# Patient Record
Sex: FEMALE | Race: WHITE | Employment: FULL TIME | URBAN - METROPOLITAN AREA
[De-identification: names, ages, dates, MRNs, and addresses within clinical notes are randomized per-mention and may not be internally consistent; named-entity substitution may affect disease eponyms.]

---

## 2017-10-05 ENCOUNTER — OFFICE VISIT (OUTPATIENT)
Dept: DERMATOLOGY | Facility: AMBULATORY SURGERY CENTER | Age: 62
End: 2017-10-05

## 2017-10-05 VITALS
HEIGHT: 61 IN | OXYGEN SATURATION: 98 % | BODY MASS INDEX: 36.25 KG/M2 | DIASTOLIC BLOOD PRESSURE: 76 MMHG | TEMPERATURE: 98.3 F | RESPIRATION RATE: 18 BRPM | WEIGHT: 192.02 LBS | HEART RATE: 78 BPM | SYSTOLIC BLOOD PRESSURE: 142 MMHG

## 2017-10-05 DIAGNOSIS — D18.01 CHERRY ANGIOMA: ICD-10-CM

## 2017-10-05 DIAGNOSIS — L82.1 OTHER SEBORRHEIC KERATOSIS: ICD-10-CM

## 2017-10-05 DIAGNOSIS — L91.8 SKIN TAG: ICD-10-CM

## 2017-10-05 DIAGNOSIS — Z85.828 FOLLOW-UP SURVEILLANCE OF SKIN CANCER, ENCOUNTER FOR: ICD-10-CM

## 2017-10-05 DIAGNOSIS — D22.9 MULTIPLE BENIGN NEVI: ICD-10-CM

## 2017-10-05 DIAGNOSIS — Z08 FOLLOW-UP SURVEILLANCE OF SKIN CANCER, ENCOUNTER FOR: ICD-10-CM

## 2017-10-05 DIAGNOSIS — Z85.820 PERSONAL HISTORY OF MALIGNANT MELANOMA: ICD-10-CM

## 2017-10-05 DIAGNOSIS — D17.21 LIPOMA OF RIGHT FOREARM: ICD-10-CM

## 2017-10-05 DIAGNOSIS — L80 VITILIGO: ICD-10-CM

## 2017-10-05 DIAGNOSIS — D17.20 LIPOMA OF UPPER ARM: ICD-10-CM

## 2017-10-05 DIAGNOSIS — D23.9 DERMATOFIBROMA: Primary | ICD-10-CM

## 2017-10-05 DIAGNOSIS — L90.5 SCAR CONDITION AND FIBROSIS OF SKIN: ICD-10-CM

## 2017-10-05 DIAGNOSIS — D22.39 FIBROUS PAPULE OF NOSE: ICD-10-CM

## 2017-10-05 RX ORDER — LEVOTHYROXINE SODIUM 150 MCG
TABLET ORAL
Refills: 2 | COMMUNITY
Start: 2017-08-01 | End: 2019-02-05

## 2017-10-05 RX ORDER — DEXTROAMPHETAMINE SACCHARATE, AMPHETAMINE ASPARTATE, DEXTROAMPHETAMINE SULFATE AND AMPHETAMINE SULFATE 5; 5; 5; 5 MG/1; MG/1; MG/1; MG/1
TABLET ORAL
Refills: 0 | COMMUNITY
Start: 2017-09-17 | End: 2019-02-05

## 2017-10-05 RX ORDER — LORAZEPAM 1 MG/1
TABLET ORAL
Refills: 5 | COMMUNITY
Start: 2017-09-15

## 2018-07-01 ENCOUNTER — HOSPITAL ENCOUNTER (EMERGENCY)
Age: 63
Discharge: HOME | End: 2018-07-01
Attending: EMERGENCY MEDICINE
Payer: COMMERCIAL

## 2018-07-01 ENCOUNTER — APPOINTMENT (OUTPATIENT)
Dept: CT IMAGING | Age: 63
End: 2018-07-01
Attending: PHYSICIAN ASSISTANT
Payer: COMMERCIAL

## 2018-07-01 VITALS
RESPIRATION RATE: 18 BRPM | WEIGHT: 201 LBS | DIASTOLIC BLOOD PRESSURE: 92 MMHG | HEIGHT: 61 IN | HEART RATE: 110 BPM | OXYGEN SATURATION: 95 % | SYSTOLIC BLOOD PRESSURE: 184 MMHG | BODY MASS INDEX: 37.95 KG/M2 | TEMPERATURE: 99.7 F

## 2018-07-01 DIAGNOSIS — K57.92 DIVERTICULITIS: Primary | ICD-10-CM

## 2018-07-01 LAB
ALBUMIN SERPL-MCNC: 3.9 G/DL (ref 3.5–5)
ALBUMIN/GLOB SERPL: 1 {RATIO} (ref 1.1–2.2)
ALP SERPL-CCNC: 86 U/L (ref 45–117)
ALT SERPL-CCNC: 26 U/L (ref 12–78)
ANION GAP SERPL CALC-SCNC: 9 MMOL/L (ref 5–15)
APPEARANCE UR: CLEAR
AST SERPL-CCNC: 13 U/L (ref 15–37)
BACTERIA URNS QL MICRO: NEGATIVE /HPF
BASOPHILS # BLD: 0.1 K/UL (ref 0–0.1)
BASOPHILS NFR BLD: 0 % (ref 0–1)
BILIRUB SERPL-MCNC: 0.7 MG/DL (ref 0.2–1)
BILIRUB UR QL: NEGATIVE
BUN SERPL-MCNC: 18 MG/DL (ref 6–20)
BUN/CREAT SERPL: 18 (ref 12–20)
CALCIUM SERPL-MCNC: 9.1 MG/DL (ref 8.5–10.1)
CHLORIDE SERPL-SCNC: 101 MMOL/L (ref 97–108)
CO2 SERPL-SCNC: 27 MMOL/L (ref 21–32)
COLOR UR: NORMAL
CREAT SERPL-MCNC: 1.02 MG/DL (ref 0.55–1.02)
DIFFERENTIAL METHOD BLD: ABNORMAL
EOSINOPHIL # BLD: 0.1 K/UL (ref 0–0.4)
EOSINOPHIL NFR BLD: 1 % (ref 0–7)
EPITH CASTS URNS QL MICRO: NORMAL /LPF
ERYTHROCYTE [DISTWIDTH] IN BLOOD BY AUTOMATED COUNT: 13.1 % (ref 11.5–14.5)
GLOBULIN SER CALC-MCNC: 3.9 G/DL (ref 2–4)
GLUCOSE SERPL-MCNC: 119 MG/DL (ref 65–100)
GLUCOSE UR STRIP.AUTO-MCNC: NEGATIVE MG/DL
HCT VFR BLD AUTO: 45.3 % (ref 35–47)
HGB BLD-MCNC: 15.3 G/DL (ref 11.5–16)
HGB UR QL STRIP: NEGATIVE
HYALINE CASTS URNS QL MICRO: NORMAL /LPF (ref 0–5)
IMM GRANULOCYTES # BLD: 0.1 K/UL (ref 0–0.04)
IMM GRANULOCYTES NFR BLD AUTO: 0 % (ref 0–0.5)
KETONES UR QL STRIP.AUTO: NEGATIVE MG/DL
LEUKOCYTE ESTERASE UR QL STRIP.AUTO: NEGATIVE
LIPASE SERPL-CCNC: 97 U/L (ref 73–393)
LYMPHOCYTES # BLD: 1.8 K/UL (ref 0.8–3.5)
LYMPHOCYTES NFR BLD: 11 % (ref 12–49)
MCH RBC QN AUTO: 29.4 PG (ref 26–34)
MCHC RBC AUTO-ENTMCNC: 33.8 G/DL (ref 30–36.5)
MCV RBC AUTO: 87.1 FL (ref 80–99)
MONOCYTES # BLD: 1.4 K/UL (ref 0–1)
MONOCYTES NFR BLD: 8 % (ref 5–13)
NEUTS SEG # BLD: 13.3 K/UL (ref 1.8–8)
NEUTS SEG NFR BLD: 80 % (ref 32–75)
NITRITE UR QL STRIP.AUTO: NEGATIVE
NRBC # BLD: 0 K/UL (ref 0–0.01)
NRBC BLD-RTO: 0 PER 100 WBC
PH UR STRIP: 6 [PH] (ref 5–8)
PLATELET # BLD AUTO: 268 K/UL (ref 150–400)
PMV BLD AUTO: 9.6 FL (ref 8.9–12.9)
POTASSIUM SERPL-SCNC: 3.8 MMOL/L (ref 3.5–5.1)
PROT SERPL-MCNC: 7.8 G/DL (ref 6.4–8.2)
PROT UR STRIP-MCNC: NEGATIVE MG/DL
RBC # BLD AUTO: 5.2 M/UL (ref 3.8–5.2)
RBC #/AREA URNS HPF: NORMAL /HPF (ref 0–5)
SODIUM SERPL-SCNC: 137 MMOL/L (ref 136–145)
SP GR UR REFRACTOMETRY: 1.02 (ref 1–1.03)
UR CULT HOLD, URHOLD: NORMAL
UROBILINOGEN UR QL STRIP.AUTO: 1 EU/DL (ref 0.2–1)
WBC # BLD AUTO: 16.8 K/UL (ref 3.6–11)
WBC URNS QL MICRO: NORMAL /HPF (ref 0–4)

## 2018-07-01 PROCEDURE — 96361 HYDRATE IV INFUSION ADD-ON: CPT

## 2018-07-01 PROCEDURE — 81001 URINALYSIS AUTO W/SCOPE: CPT | Performed by: PHYSICIAN ASSISTANT

## 2018-07-01 PROCEDURE — 74011250637 HC RX REV CODE- 250/637: Performed by: PHYSICIAN ASSISTANT

## 2018-07-01 PROCEDURE — 80053 COMPREHEN METABOLIC PANEL: CPT | Performed by: PHYSICIAN ASSISTANT

## 2018-07-01 PROCEDURE — 36415 COLL VENOUS BLD VENIPUNCTURE: CPT | Performed by: PHYSICIAN ASSISTANT

## 2018-07-01 PROCEDURE — 83690 ASSAY OF LIPASE: CPT | Performed by: PHYSICIAN ASSISTANT

## 2018-07-01 PROCEDURE — 99284 EMERGENCY DEPT VISIT MOD MDM: CPT

## 2018-07-01 PROCEDURE — 74011250636 HC RX REV CODE- 250/636: Performed by: PHYSICIAN ASSISTANT

## 2018-07-01 PROCEDURE — 74011636320 HC RX REV CODE- 636/320: Performed by: RADIOLOGY

## 2018-07-01 PROCEDURE — 74177 CT ABD & PELVIS W/CONTRAST: CPT

## 2018-07-01 PROCEDURE — 85025 COMPLETE CBC W/AUTO DIFF WBC: CPT | Performed by: PHYSICIAN ASSISTANT

## 2018-07-01 PROCEDURE — 96374 THER/PROPH/DIAG INJ IV PUSH: CPT

## 2018-07-01 RX ORDER — HYDROCODONE BITARTRATE AND ACETAMINOPHEN 5; 325 MG/1; MG/1
1 TABLET ORAL
Qty: 20 TAB | Refills: 0 | Status: SHIPPED | OUTPATIENT
Start: 2018-07-01 | End: 2018-12-19

## 2018-07-01 RX ORDER — MORPHINE SULFATE 4 MG/ML
4 INJECTION INTRAVENOUS
Status: COMPLETED | OUTPATIENT
Start: 2018-07-01 | End: 2018-07-01

## 2018-07-01 RX ORDER — METRONIDAZOLE 500 MG/1
500 TABLET ORAL 2 TIMES DAILY
Qty: 14 TAB | Refills: 0 | Status: SHIPPED | OUTPATIENT
Start: 2018-07-01 | End: 2018-07-08

## 2018-07-01 RX ORDER — ACETAMINOPHEN 500 MG
1000 TABLET ORAL
Status: COMPLETED | OUTPATIENT
Start: 2018-07-01 | End: 2018-07-01

## 2018-07-01 RX ORDER — CIPROFLOXACIN 500 MG/1
500 TABLET ORAL 2 TIMES DAILY
Qty: 14 TAB | Refills: 0 | Status: SHIPPED | OUTPATIENT
Start: 2018-07-01 | End: 2018-07-08

## 2018-07-01 RX ORDER — CIPROFLOXACIN 500 MG/1
500 TABLET ORAL
Status: COMPLETED | OUTPATIENT
Start: 2018-07-01 | End: 2018-07-01

## 2018-07-01 RX ORDER — METRONIDAZOLE 250 MG/1
500 TABLET ORAL
Status: COMPLETED | OUTPATIENT
Start: 2018-07-01 | End: 2018-07-01

## 2018-07-01 RX ADMIN — MORPHINE SULFATE 4 MG: 4 INJECTION INTRAVENOUS at 19:49

## 2018-07-01 RX ADMIN — CIPROFLOXACIN HYDROCHLORIDE 500 MG: 500 TABLET, FILM COATED ORAL at 20:07

## 2018-07-01 RX ADMIN — METRONIDAZOLE 500 MG: 250 TABLET ORAL at 20:06

## 2018-07-01 RX ADMIN — ACETAMINOPHEN 1000 MG: 500 TABLET ORAL at 20:06

## 2018-07-01 RX ADMIN — IOPAMIDOL 100 ML: 755 INJECTION, SOLUTION INTRAVENOUS at 18:12

## 2018-07-01 RX ADMIN — SODIUM CHLORIDE 1000 ML: 900 INJECTION, SOLUTION INTRAVENOUS at 19:50

## 2018-07-01 RX ADMIN — SODIUM CHLORIDE 1000 ML: 900 INJECTION, SOLUTION INTRAVENOUS at 18:23

## 2018-07-01 NOTE — ED PROVIDER NOTES
HPI Comments: Mariah Ng is a 63 y.o. female  who presents by private vehicle to ER with c/o Patient presents with:  Abdominal Pain. Patient presents with LLQ abdominal pain since yesterday with fever. Patient seen at [facility] today and sent to ED for further evaluation. She specifically denies any  chills, nausea, vomiting, chest pain, shortness of breath, headache, rash, diarrhea, urinary/bowel changes, sweating or weight loss. PCP: Javier Hubbard MD   PMHx significant for: Past Medical History:  No date: Arthritis  No date: GERD (gastroesophageal reflux disease)  No date: Hypothyroid  No date: Kidney stones  12/2015: Shingles  2004: Skin cancer      Comment: melanoma- L flank  2006: Skin cancer      Comment: melanoma- L frontal scalp  No date: Sleep apnea  No date: Sun-damaged skin  No date: Tanning bed exposure   PSHx significant for: Past Surgical History:  No date: ABDOMEN SURGERY PROC UNLISTED      Comment: cholecystectomy  3/27/2014: HX BREAST BIOPSY      Comment: EXCISION OF LEFT BREAST NODULE performed by                Terrie Santana MD at [facility]  No date: HX COLONOSCOPY  No date: HX GYN      Comment: uterine ablation 2013  No date: HX OTHER SURGICAL      Comment: 2 melanoma surgeries, left flank and left head  No date: HX OTHER SURGICAL      Comment: adrenalectomy  2004: SKIN TISSUE PROCEDURE UNLISTED      Comment: melanoma- L flank  2006: SKIN TISSUE PROCEDURE UNLISTED      Comment: melanoma-L frontal scalp  Social Hx: Tobacco use: Smoking status: Never Smoker                                                              Smokeless status: Never Used                      ; EtOH use: The patient states she drinks socially per week.; Illicit Drug use: Allergies:  No Known Allergies    There are no other complaints, changes or physical findings at this time. Patient is a 63 y.o. female presenting with abdominal pain. The history is provided by the patient.    Abdominal Pain    This is a new problem. The current episode started yesterday. The problem occurs constantly. The problem has been gradually worsening. The pain is associated with vomiting. The pain is located in the LLQ. The quality of the pain is sharp. The pain is at a severity of 9/10. The pain is severe. Pertinent negatives include no anorexia, no fever, no belching, no diarrhea, no flatus, no hematochezia, no melena, no nausea, no vomiting, no constipation, no dysuria, no frequency, no hematuria, no headaches, no arthralgias, no myalgias, no chest pain, no testicular pain and no back pain. Nothing worsens the pain. The pain is relieved by nothing. Past workup includes colonoscopy. Past workup includes no CT scan, no ultrasound, no surgery, no esophagogastroduodenoscopy, no UGI, no barium enema. The patient's surgical history includes cholecystectomy. The patient's surgical history non-contributory. Past Medical History:   Diagnosis Date    Arthritis     GERD (gastroesophageal reflux disease)     Hypothyroid     Kidney stones     Shingles 12/2015    Skin cancer 2004    melanoma- L flank    Skin cancer 2006    melanoma- L frontal scalp    Sleep apnea     Sun-damaged skin     Tanning bed exposure        Past Surgical History:   Procedure Laterality Date    ABDOMEN SURGERY PROC UNLISTED      cholecystectomy    HX BREAST BIOPSY  3/27/2014    EXCISION OF LEFT BREAST NODULE performed by Manuel Vasquez MD at [address] [facility] HX COLONOSCOPY      HX GYN      uterine ablation 2013    HX OTHER SURGICAL      2 melanoma surgeries, left flank and left head    HX OTHER SURGICAL      adrenalectomy    SKIN TISSUE PROCEDURE UNLISTED  2004    melanoma- L flank    SKIN TISSUE PROCEDURE UNLISTED  2006    melanoma-L frontal scalp         History reviewed. No pertinent family history.     Social History     Social History    Marital status:      Spouse name: N/A    Number of children: N/A    Years of education: N/A Occupational History    Not on file. Social History Main Topics    Smoking status: Never Smoker    Smokeless tobacco: Never Used    Alcohol use Yes      Comment: socially    Drug use: Not on file    Sexual activity: Not on file     Other Topics Concern    Not on file     Social History Narrative         ALLERGIES: Review of patient's allergies indicates no known allergies. Review of Systems   Constitutional: Negative for fever. Cardiovascular: Negative for chest pain. Gastrointestinal: Positive for abdominal pain. Negative for anorexia, constipation, diarrhea, flatus, hematochezia, melena, nausea and vomiting. Genitourinary: Negative for dysuria, frequency, hematuria and testicular pain. Musculoskeletal: Negative for arthralgias, back pain and myalgias. Neurological: Negative for headaches. All other systems reviewed and are negative. Vitals:    07/01/18 1918 07/01/18 1930 07/01/18 1945 07/01/18 1952   BP: (!) 184/92      Pulse: (!) 110      Resp: 18      Temp:    (!) 100.7 °F (38.2 °C)   SpO2: 98% 97% 96%    Weight:       Height:                Physical Exam     MDM  Number of Diagnoses or Management Options  Diverticulitis:   Diagnosis management comments: Assesment/Plan- 63 y.o. Patient presents with:  Abdominal Pain  differential includes: diverticulitis, colitis, SBO. Labs and imaging reviewed with leukocytosis, elevated glucose, ct showing diverticulitis. Patient well appearing, fever improved, tolerating PO. Discharged home. Recommend GI follow up. Patient educated on reasons to return to the ED.          Amount and/or Complexity of Data Reviewed  Clinical lab tests: ordered and reviewed  Tests in the radiology section of CPT®: ordered and reviewed  Tests in the medicine section of CPT®: ordered and reviewed  Discuss the patient with other providers: yes (Attending- Dr. Bunny Marques who also saw patient and agrees with plan  )          ED Course       Procedures

## 2018-07-01 NOTE — ED NOTES
Bedside and Verbal shift change report given to Naa Dunn RN (oncoming nurse) by Sha Lambert RN (offgoing nurse). Report included the following information SBAR, Kardex, ED Summary, Procedure Summary, Intake/Output, MAR and Recent Results.

## 2018-07-01 NOTE — ED TRIAGE NOTES
Pt states left lower abdominal pain and swelling and tenderness onset yesterday.  Pt denies any nausea, vomiting or diarrhea

## 2018-07-02 NOTE — ED NOTES
HARLEEN Garcia at bedside discharging patient; instructions reviewed by provider. Patient exited ED prior to RN reassessment.

## 2018-07-02 NOTE — DISCHARGE INSTRUCTIONS
Diverticulitis: Care Instructions  Your Care Instructions    Diverticulitis occurs when pouches form in the wall of the colon and become inflamed or infected. It can be very painful. Doctors aren't sure what causes diverticulitis. There is no proof that foods such as nuts, seeds, or berries cause it or make it worse. A low-fiber diet may cause the colon to work harder to push stool forward. Pouches may form because of this extra work. It may be hard to think about healthy eating while you're in pain. But as you recover, you might think about how you can use healthy eating for overall better health. Healthy eating may help you avoid future attacks. Follow-up care is a key part of your treatment and safety. Be sure to make and go to all appointments, and call your doctor if you are having problems. It's also a good idea to know your test results and keep a list of the medicines you take. How can you care for yourself at home? · Drink plenty of fluids, enough so that your urine is light yellow or clear like water. If you have kidney, heart, or liver disease and have to limit fluids, talk with your doctor before you increase the amount of fluids you drink. · Stick to liquids or a bland diet (plain rice, bananas, dry toast or crackers, applesauce) until you are feeling better. Then you can return to regular foods and gradually increase the amount of fiber in your diet. · Use a heating pad set on low on your belly to relieve mild cramps and pain. · Get extra rest until you are feeling better. · Be safe with medicines. Read and follow all instructions on the label. ¨ If the doctor gave you a prescription medicine for pain, take it as prescribed. ¨ If you are not taking a prescription pain medicine, ask your doctor if you can take an over-the-counter medicine. · If your doctor prescribed antibiotics, take them as directed. Do not stop taking them just because you feel better.  You need to take the full course of antibiotics. To prevent future attacks of diverticulitis  · Avoid constipation:  ¨ Include fruits, vegetables, beans, and whole grains in your diet each day. These foods are high in fiber. ¨ Drink plenty of fluids, enough so that your urine is light yellow or clear like water. If you have kidney, heart, or liver disease and have to limit fluids, talk with your doctor before you increase the amount of fluids you drink. ¨ Get some exercise every day. Build up slowly to 30 to 60 minutes a day on 5 or more days of the week. ¨ Take a fiber supplement, such as Citrucel or Metamucil, every day if needed. Read and follow all instructions on the label. ¨ Schedule time each day for a bowel movement. Having a daily routine may help. Take your time and do not strain when having a bowel movement. When should you call for help? Call your doctor now or seek immediate medical care if:  ? · You have a fever. ? · You are vomiting. ? · You have new or worse belly pain. ? · You cannot pass stools or gas. ? Watch closely for changes in your health, and be sure to contact your doctor if you have any problems. Where can you learn more? Go to http://comfort-trevin.info/. Enter H901 in the search box to learn more about \"Diverticulitis: Care Instructions. \"  Current as of: May 12, 2017  Content Version: 11.4  © 9200-9362 [employer]. Care instructions adapted under license by [employer] (which disclaims liability or warranty for this information). If you have questions about a medical condition or this instruction, always ask your healthcare professional. [address] any warranty or liability for your use of this information. We hope that we have addressed all of your medical concerns. The examination and treatment you received in the Emergency Department were for an emergent problem and were not intended as complete care.  It is important that you follow up with your healthcare provider(s) for ongoing care. If your symptoms worsen or do not improve as expected, and you are unable to reach your usual health care provider(s), you should return to the Emergency Department. Today's healthcare is undergoing tremendous change, and patient satisfaction surveys are one of the many tools to assess the quality of medical care. You may receive a survey from the [employer] regarding your experience in the Emergency Department. I hope that your experience has been completely positive, particularly the medical care that I provided. As such, please participate in the survey; anything less than excellent does not meet my expectations or intentions. [facility] and [facility] participate in nationally recognized quality of care measures. If your blood pressure is greater than 120/80, as reported below, we urge that you seek medical care to address the potential of high blood pressure, commonly known as hypertension. Hypertension can be hereditary or can be caused by certain medical conditions, pain, stress, or \"white coat syndrome. \"       Please make an appointment with your health care provider(s) for follow up of your Emergency Department visit. VITALS:   Patient Vitals for the past 8 hrs:   Temp Pulse Resp BP SpO2   07/01/18 2056 99.7 °F (37.6 °C) - - - -   07/01/18 2045 - - - - 95 %   07/01/18 2030 - - - - 97 %   07/01/18 2015 - - - - 98 %   07/01/18 2000 - - - - 94 %   07/01/18 1952 (!) 100.7 °F (38.2 °C) - - - -   07/01/18 1945 - - - - 96 %   07/01/18 1930 - - - - 97 %   07/01/18 1918 - (!) 110 18 (!) 184/92 98 %   07/01/18 1744 99 °F (37.2 °C) (!) 120 18 140/83 98 %          Thank you for allowing us to provide you with medical care today. We realize that you have many choices for your emergency care needs. Please choose us in the future for any continued health care needs.       Regards, Yadira Winters, [facility].   Office: [phone]            Recent Results (from the past 24 hour(s))   CBC WITH AUTOMATED DIFF    Collection Time: 07/01/18  6:20 PM   Result Value Ref Range    WBC 16.8 (H) 3.6 - 11.0 K/uL    RBC 5.20 3.80 - 5.20 M/uL    HGB 15.3 11.5 - 16.0 g/dL    HCT 45.3 35.0 - 47.0 %    MCV 87.1 80.0 - 99.0 FL    MCH 29.4 26.0 - 34.0 PG    MCHC 33.8 30.0 - 36.5 g/dL    RDW 13.1 11.5 - 14.5 %    PLATELET 174 970 - 714 K/uL    MPV 9.6 8.9 - 12.9 FL    NRBC 0.0 0  WBC    ABSOLUTE NRBC 0.00 0.00 - 0.01 K/uL    NEUTROPHILS 80 (H) 32 - 75 %    LYMPHOCYTES 11 (L) 12 - 49 %    MONOCYTES 8 5 - 13 %    EOSINOPHILS 1 0 - 7 %    BASOPHILS 0 0 - 1 %    IMMATURE GRANULOCYTES 0 0.0 - 0.5 %    ABS. NEUTROPHILS 13.3 (H) 1.8 - 8.0 K/UL    ABS. LYMPHOCYTES 1.8 0.8 - 3.5 K/UL    ABS. MONOCYTES 1.4 (H) 0.0 - 1.0 K/UL    ABS. EOSINOPHILS 0.1 0.0 - 0.4 K/UL    ABS. BASOPHILS 0.1 0.0 - 0.1 K/UL    ABS. IMM. GRANS. 0.1 (H) 0.00 - 0.04 K/UL    DF AUTOMATED     LIPASE    Collection Time: 07/01/18  6:20 PM   Result Value Ref Range    Lipase 97 73 - 163 U/L   METABOLIC PANEL, COMPREHENSIVE    Collection Time: 07/01/18  6:20 PM   Result Value Ref Range    Sodium 137 136 - 145 mmol/L    Potassium 3.8 3.5 - 5.1 mmol/L    Chloride 101 97 - 108 mmol/L    CO2 27 21 - 32 mmol/L    Anion gap 9 5 - 15 mmol/L    Glucose 119 (H) 65 - 100 mg/dL    BUN 18 6 - 20 MG/DL    Creatinine 1.02 0.55 - 1.02 MG/DL    BUN/Creatinine ratio 18 12 - 20      GFR est AA >60 >60 ml/min/1.73m2    GFR est non-AA 55 (L) >60 ml/min/1.73m2    Calcium 9.1 8.5 - 10.1 MG/DL    Bilirubin, total 0.7 0.2 - 1.0 MG/DL    ALT (SGPT) 26 12 - 78 U/L    AST (SGOT) 13 (L) 15 - 37 U/L    Alk.  phosphatase 86 45 - 117 U/L    Protein, total 7.8 6.4 - 8.2 g/dL    Albumin 3.9 3.5 - 5.0 g/dL    Globulin 3.9 2.0 - 4.0 g/dL    A-G Ratio 1.0 (L) 1.1 - 2.2     URINALYSIS W/MICROSCOPIC    Collection Time: 07/01/18  6:20 PM   Result Value Ref Range    Color YELLOW/STRAW      Appearance CLEAR CLEAR      Specific gravity 1.021 1.003 - 1.030      pH (UA) 6.0 5.0 - 8.0      Protein NEGATIVE  NEG mg/dL    Glucose NEGATIVE  NEG mg/dL    Ketone NEGATIVE  NEG mg/dL    Bilirubin NEGATIVE  NEG      Blood NEGATIVE  NEG      Urobilinogen 1.0 0.2 - 1.0 EU/dL    Nitrites NEGATIVE  NEG      Leukocyte Esterase NEGATIVE  NEG      WBC 0-4 0 - 4 /hpf    RBC 0-5 0 - 5 /hpf    Epithelial cells FEW FEW /lpf    Bacteria NEGATIVE  NEG /hpf    Hyaline cast 0-2 0 - 5 /lpf   URINE CULTURE HOLD SAMPLE    Collection Time: 07/01/18  6:20 PM   Result Value Ref Range    Urine culture hold        URINE ON HOLD IN MICROBIOLOGY DEPT FOR 3 DAYS. IF UNPRESERVED URINE IS SUBMITTED, IT CANNOT BE USED FOR ADDITIONAL TESTING AFTER 24 HRS, RECOLLECTION WILL BE REQUIRED. Ct Abd Pelv W Cont    Result Date: 7/1/2018  EXAM:  CT ABD PELV W CONT INDICATION: LLQ abdominal pain COMPARISON: April 16, 2009 CONTRAST:  100 mL of Isovue-370. TECHNIQUE: Following the uneventful intravenous administration of contrast, thin axial images were obtained through the abdomen and pelvis. Coronal and sagittal reconstructions were generated. Oral contrast was not administered. CT dose reduction was achieved through use of a standardized protocol tailored for this examination and automatic exposure control for dose modulation. FINDINGS: LUNG BASES: Clear. INCIDENTALLY IMAGED HEART AND MEDIASTINUM: Unremarkable. LIVER: No mass or biliary dilatation. GALLBLADDER: Surgically absent. SPLEEN: No mass. PANCREAS: No mass or ductal dilatation. ADRENALS: Normal right adrenal gland. Left adrenal gland is surgically absent. KIDNEYS: No mass, calculus, or hydronephrosis. 12 mm left renal cyst. STOMACH: Unremarkable. SMALL BOWEL: No dilatation or wall thickening. COLON: Sigmoid diverticulosis. Wall thickening and pericolonic inflammatory changes are noted in the proximal sigmoid colon. No associated abscess. APPENDIX: Normal. PERITONEUM: No ascites or pneumoperitoneum. RETROPERITONEUM: No lymphadenopathy or aortic aneurysm. REPRODUCTIVE ORGANS: Normal uterus and ovaries. URINARY BLADDER: No mass or calculus. BONES: No destructive bone lesion. ADDITIONAL COMMENTS: N/A     IMPRESSION: Acute sigmoid colon diverticulitis, without evidence of abscess or perforation. Clinical follow-up is recommended to assure complete resolution.

## 2018-07-05 ENCOUNTER — PATIENT OUTREACH (OUTPATIENT)
Dept: OTHER | Age: 63
End: 2018-07-05

## 2018-07-12 ENCOUNTER — PATIENT OUTREACH (OUTPATIENT)
Dept: OTHER | Age: 63
End: 2018-07-12

## 2018-07-12 NOTE — PROGRESS NOTES
Transition Of Care Note    Patient discharged from ED following episode of acute abdominal pain with noted recent diet change and history of diverticulosis; Medical History:     Past Medical History:   Diagnosis Date    Arthritis     GERD (gastroesophageal reflux disease)     Hypothyroid     Kidney stones     Shingles 2015    Skin cancer     melanoma- L flank    Skin cancer     melanoma- L frontal scalp    Sleep apnea     Sun-damaged skin     Tanning bed exposure        Care Manager contacted the patient by telephone to perform post ED discharge assessment. Verified  and zip code with patient as identifiers. Provided introduction to self, and explanation of the Nurse Care Manager role. Agreed to CM follow-up and assistance. CM initial assessment completed. States she was feeling better, has been taking Norco for pain with good control, until today;  Stated she ate some diana seed products lately and feels this may have caused the diverticulitis symptoms; Note in ER patient with acute abdominal pain, leukocytosis with left shift and fever 101;     Medication:   New Medications at Discharge:  Norco, Flagyl and Cipro  Changed Medications at Discharge:  NA   Discontinued Medications at Discharge: None    Current Outpatient Prescriptions   Medication Sig    HYDROcodone-acetaminophen (NORCO) 5-325 mg per tablet Take 1 Tab by mouth every four (4) hours as needed for Pain. Max Daily Amount: 6 Tabs.  SYNTHROID 150 mcg tablet TAKE 1 TABLET ON AN EMPTY STOMACH IN THE MORNING ONCE A DAY ORALLY 90 DAYS    LORazepam (ATIVAN) 1 mg tablet TAKE 1 TABLET BY MOUTH TWICE A DAY AS NEEDED FOR ANXIETY    ALPRAZolam (XANAX) 2 mg tablet TAKE 1 TABLET BY MOUTH AT BEDTIME    hydrochlorothiazide (HYDRODIURIL) 25 mg tablet     esomeprazole (NEXIUM) 20 mg capsule Take  by mouth daily.  dextroamphetamine-amphetamine (ADDERALL) 10 mg tablet Take 10 mg by mouth.     levothyroxine (SYNTHROID) 137 mcg tablet Take  by mouth Daily (before breakfast).  escitalopram (LEXAPRO) 20 mg tablet Take 20 mg by mouth daily.  cholecalciferol, vitamin D3, (VITAMIN D3) 2,000 unit Tab Take  by mouth.  dextroamphetamine-amphetamine (ADDERALL) 20 mg tablet TAKE 1 TABLET BY MOUTH TWICE A DAY    potassium chloride SR (KLOR-CON 10) 10 mEq tablet Take 20 mEq by mouth daily. No current facility-administered medications for this visit. There are no discontinued medications. Performed medication reconciliation with patient, and patient verbalizes understanding of administration of home medications. There were no barriers to obtaining medications identified at this time. Inpatient RRAT score: NA   Was this a readmission? no   Patient stated reason for the readmission: NA    Barriers/Support system:  patient    Barriers/Challenges to Care: []  Decline in memory    []  Language barrier     []  Emotional                  []  Limited mobility  []  Lack of motivation     [] Vision, hearing or cognitive impairment [x]  Knowledge [] Financial Barriers []  Lack of support  []  Pain []  Other     Red Flags:  Call your doctor if you   · Have new or worsening fever, chills;  · Start vomiting or become unable to tolerate fluids;  · Develop worsening belly pain;  · Do not pass stool or gas; Discharge Instructions :  Reviewed discharge instructions with patient. Patient verbalizes understanding of discharge instructions and follow-up care. Advance Care Planning:   Patient was offered the opportunity to discuss advance care planning:  yes     Does patient have an Advance Directive:  yes   If no, did you provide information on Caring Connections?  no     PCP/Specialist follow up: Patient scheduled to follow up with Javier Hubbard MD.  No appt made as yet, declined assistance, but plans to schedule a FU.      Future Appointments  Date Time Provider Anastasia Sosa   10/8/2018 3:30 PM Kyle Gutierrez NP [facility] [address]      Reviewed red flags with patient, and patient verbalizes understanding. Patient given an opportunity to ask questions. No other clinical/social/functional needs noted. The patient agrees to contact the PCP office for questions related to their healthcare. The patient expressed thanks, offered no additional questions and ended the call.

## 2018-08-01 ENCOUNTER — PATIENT OUTREACH (OUTPATIENT)
Dept: OTHER | Age: 63
End: 2018-08-01

## 2018-08-01 NOTE — PROGRESS NOTES
Care Manager contacted the patient by telephone in follow up. Verified  and zip code with patient as identifiers. Assessment of learning acknowledged and behaviors demonstrated since our last call, as well as, education and evaluation performed during this call as part of the ongoing care plan: 
 
Ongoing Plan of Care: 
Self-Management Skills for Diverticulitis Demonstrates skills to manage Diverticular disease and prevent flares · Altered Nutrition Reduced nausea during flares in condition 
o Take her antiemetic when needed to prevent vomting; 
o Drink plenty of fluids;  Urine should be light yellow or clear like water; 
o Initially stick to Clear or Full Liquids or a bland diet until you feel better; 
o Eat bland - plain rice, bananas, dry toast or crackers, applesauce until you feel better; 
o GOAL Met, no further symptoms at this time; 
 
· Activity Intolerance during Flares Demonstrates behavior changes to allow for healing/rest 
· Get extra rest until you are feeling better, at least 8 hrs sleep; 
· GOAL MET, has returned to work and resumed normal activity; Medication Regimen Change: None; 
Completed a review of medications with patient, who verbalized understanding of how and when to take medications. Barriers / Adherence with medications: None; 
 
Upcoming Appointments: Did not want to schedule PCP FU appt; Patient asked questions appropriately and denied any additional needs at this time. Patient verbalized understanding of all information discussed. Patient has my name and contact information for any follow up needs or questions.  
 
Plan: FU in two weeks to resolve if no further needs identified;

## 2018-08-13 ENCOUNTER — PATIENT OUTREACH (OUTPATIENT)
Dept: OTHER | Age: 63
End: 2018-08-13

## 2018-08-14 NOTE — PROGRESS NOTES
Resolving current episode. Transitions of care complete. No recurrence of red flags. Goals met. No further ED/UC or hospital admissions within 30 days post discharge. Patient attended follow-up appointments as directed. No outreach from patient to [address] [place] [address].

## 2018-12-04 DIAGNOSIS — Z12.39 BREAST SCREENING: Primary | ICD-10-CM

## 2018-12-19 ENCOUNTER — OFFICE VISIT (OUTPATIENT)
Dept: URGENT CARE | Age: 63
End: 2018-12-19

## 2018-12-19 VITALS
SYSTOLIC BLOOD PRESSURE: 198 MMHG | DIASTOLIC BLOOD PRESSURE: 91 MMHG | BODY MASS INDEX: 39.46 KG/M2 | TEMPERATURE: 97.9 F | RESPIRATION RATE: 16 BRPM | WEIGHT: 209 LBS | OXYGEN SATURATION: 99 % | HEART RATE: 100 BPM | HEIGHT: 61 IN

## 2018-12-19 DIAGNOSIS — M25.561 RIGHT KNEE PAIN, UNSPECIFIED CHRONICITY: Primary | ICD-10-CM

## 2018-12-19 PROBLEM — E66.01 SEVERE OBESITY (HCC): Status: ACTIVE | Noted: 2018-12-19

## 2018-12-19 RX ORDER — DICLOFENAC SODIUM 75 MG/1
75 TABLET, DELAYED RELEASE ORAL 2 TIMES DAILY
Qty: 30 TAB | Refills: 0 | Status: SHIPPED | OUTPATIENT
Start: 2018-12-19 | End: 2019-02-05

## 2018-12-19 NOTE — PATIENT INSTRUCTIONS
Knee: Exercises  Your Care Instructions  Here are some examples of exercises for your knee. Start each exercise slowly. Ease off the exercise if you start to have pain. Your doctor or physical therapist will tell you when you can start these exercises and which ones will work best for you. How to do the exercises  Quad sets    1. Sit with your leg straight and supported on the floor or a firm bed. (If you feel discomfort in the front or back of your knee, place a small towel roll under your knee.)  2. Tighten the muscles on top of your thigh by pressing the back of your knee flat down to the floor. (If you feel discomfort under your kneecap, place a small towel roll under your knee.)  3. Hold for about 6 seconds, then rest for up to 10 seconds. 4. Do 8 to 12 repetitions several times a day. Straight-leg raises to the front    1. Lie on your back with your good knee bent so that your foot rests flat on the floor. Your injured leg should be straight. Make sure that your low back has a normal curve. You should be able to slip your flat hand in between the floor and the small of your back, with your palm touching the floor and your back touching the back of your hand. 2. Tighten the thigh muscles in the injured leg by pressing the back of your knee flat down to the floor. Hold your knee straight. 3. Keeping the thigh muscles tight, lift your injured leg up so that your heel is about 12 inches off the floor. Hold for about 6 seconds and then lower slowly. 4. Do 8 to 12 repetitions, 3 times a day. Straight-leg raises to the outside    1. Lie on your side, with your injured leg on top. 2. Tighten the front thigh muscles of your injured leg to keep your knee straight. 3. Keep your hip and your leg straight in line with the rest of your body, and keep your knee pointing forward. Do not drop your hip back. 4. Lift your injured leg straight up toward the ceiling, about 12 inches off the floor.  Hold for about 6 seconds, then slowly lower your leg. 5. Do 8 to 12 repetitions. Straight-leg raises to the back    1. Lie on your stomach, and lift your leg straight up behind you (toward the ceiling). 2. Lift your toes about 6 inches off the floor, hold for about 6 seconds, then lower slowly. 3. Do 8 to 12 repetitions. Straight-leg raises to the inside    1. Lie on the side of your body with the injured leg. 2. You can either prop your other (good) leg up on a chair, or you can bend your good knee and put that foot in front of your injured knee. Do not drop your hip back. 3. Tighten the muscles on the front of your thigh to straighten your injured knee. 4. Keep your kneecap pointing forward, and lift your whole leg up toward the ceiling about 6 inches. Hold for about 6 seconds, then lower slowly. 5. Do 8 to 12 repetitions. Heel dig bridging    1. Lie on your back with both knees bent and your ankles bent so that only your heels are digging into the floor. Your knees should be bent about 90 degrees. 2. Then push your heels into the floor, squeeze your buttocks, and lift your hips off the floor until your shoulders, hips, and knees are all in a straight line. 3. Hold for about 6 seconds as you continue to breathe normally, and then slowly lower your hips back down to the floor and rest for up to 10 seconds. 4. Do 8 to 12 repetitions. Hamstring curls    1. Lie on your stomach with your knees straight. If your kneecap is uncomfortable, roll up a washcloth and put it under your leg just above your kneecap. 2. Lift the foot of your injured leg by bending the knee so that you bring the foot up toward your buttock. If this motion hurts, try it without bending your knee quite as far. This may help you avoid any painful motion. 3. Slowly lower your leg back to the floor. 4. Do 8 to 12 repetitions.   5. With permission from your doctor or physical therapist, you may also want to add a cuff weight to your ankle (not more than 5 pounds). With weight, you do not have to lift your leg more than 12 inches to get a hamstring workout. Shallow standing knee bends    1. Stand with your hands lightly resting on a counter or chair in front of you. Put your feet shoulder-width apart. 2. Slowly bend your knees so that you squat down like you are going to sit in a chair. Make sure your knees do not go in front of your toes. 3. Lower yourself about 6 inches. Your heels should remain on the floor at all times. 4. Rise slowly to a standing position. Heel raises    1. Stand with your feet a few inches apart, with your hands lightly resting on a counter or chair in front of you. 2. Slowly raise your heels off the floor while keeping your knees straight. 3. Hold for about 6 seconds, then slowly lower your heels to the floor. 4. Do 8 to 12 repetitions several times during the day. Follow-up care is a key part of your treatment and safety. Be sure to make and go to all appointments, and call your doctor if you are having problems. It's also a good idea to know your test results and keep a list of the medicines you take. Where can you learn more? Go to http://comfort-trevin.info/. Enter F575 in the search box to learn more about \"Knee: Exercises. \"  Current as of: November 29, 2017  Content Version: 11.8  © 6246-0478 Healthwise, [employer]. Care instructions adapted under license by [employer] (which disclaims liability or warranty for this information). If you have questions about a medical condition or this instruction, always ask your healthcare professional. [place] [address] any warranty or liability for your use of this information.

## 2018-12-19 NOTE — PROGRESS NOTES
Knee Pain   This is a new problem. Episode onset: few months. The problem occurs daily. The problem has been gradually worsening. Associated symptoms comments: Stiffness- decreased movement. The symptoms are aggravated by walking, twisting and bending. The symptoms are relieved by rest. She has tried acetaminophen for the symptoms. The treatment provided mild relief. Past Medical History:   Diagnosis Date    Arthritis     GERD (gastroesophageal reflux disease)     Hypothyroid     Kidney stones     Shingles 12/2015    Skin cancer 2004    melanoma- L flank    Skin cancer 2006    melanoma- L frontal scalp    Sleep apnea     Sun-damaged skin     Tanning bed exposure         Past Surgical History:   Procedure Laterality Date    ABDOMEN SURGERY PROC UNLISTED      cholecystectomy    HX BREAST BIOPSY  3/27/2014    [facility] [address] HX COLONOSCOPY      HX GYN      uterine ablation 2013    HX OTHER SURGICAL      2 melanoma surgeries, left flank and left head    HX OTHER SURGICAL      adrenalectomy    SKIN TISSUE PROCEDURE UNLISTED  2004    melanoma- L flank    SKIN TISSUE PROCEDURE UNLISTED  2006    melanoma-L frontal scalp         History reviewed. No pertinent family history.      Social History     Socioeconomic History    Marital status:      Spouse name: Not on file    Number of children: Not on file    Years of education: Not on file    Highest education level: Not on file   Social Needs    Financial resource strain: Not on file    Food insecurity - worry: Not on file    Food insecurity - inability: Not on file    Transportation needs - medical: Not on file   [employer] needs - non-medical: Not on file   Occupational History    Not on file   Tobacco Use    Smoking status: Never Smoker    Smokeless tobacco: Never Used   Substance and Sexual Activity    Alcohol use: Yes     Comment: socially    Drug use: Not on file    Sexual activity: Not on file   Other Topics Concern    Not on file   Social History Narrative    Not on file                ALLERGIES: Patient has no known allergies. Review of Systems   All other systems reviewed and are negative. Vitals:    12/19/18 1737   BP: (!) 198/91   Pulse: 100   Resp: 16   Temp: 97.9 °F (36.6 °C)   SpO2: 99%   Weight: 209 lb (94.8 kg)   Height: 5' 1\" (1.549 m)       Physical Exam   Constitutional: No distress. Musculoskeletal:        Right hip: Normal.        Right knee: She exhibits normal range of motion, no swelling, no effusion, no deformity, normal alignment, no LCL laxity, normal patellar mobility, no bony tenderness, normal meniscus and no MCL laxity. No tenderness found. Nursing note and vitals reviewed. MDM    Procedures    ICD-10-CM ICD-9-CM    1. Right knee pain, unspecified chronicity M25.561 719.46 CANCELED: XR KNEE RT MAX 2 VWS     Medications Ordered Today   Medications    diclofenac EC (VOLTAREN) 75 mg EC tablet     Sig: Take 1 Tab by mouth two (2) times a day. Dispense:  30 Tab     Refill:  0     Self exercise  Results for orders placed or performed in visit on 12/19/18   XR KNEE RT 3 V    Narrative    EXAM: XR KNEE RT 3 V    INDICATION: R knee pain. COMPARISON: None. FINDINGS: Three views of the right knee demonstrate no fracture or other acute  osseous or articular abnormality. There is no effusion. There is mild spurring  in all compartments without significant joint space narrowing. Impression    IMPRESSION: No acute findings. Very mild osteoarthritis. .     The patients condition was discussed with the patient and they understand. The patient is to follow up with primary care doctor. If signs and symptoms become worse the pt is to go to the ER. The patient is to take medications as prescribed.

## 2019-01-04 DIAGNOSIS — Z12.39 SCREENING BREAST EXAMINATION: Primary | ICD-10-CM

## 2019-01-10 ENCOUNTER — HOSPITAL ENCOUNTER (OUTPATIENT)
Dept: MAMMOGRAPHY | Age: 64
Discharge: HOME | End: 2019-01-10
Attending: SURGERY
Payer: COMMERCIAL

## 2019-01-10 DIAGNOSIS — Z12.39 BREAST SCREENING: ICD-10-CM

## 2019-01-10 PROCEDURE — 77067 SCR MAMMO BI INCL CAD: CPT

## 2019-01-14 DIAGNOSIS — R92.8 ABNORMAL MAMMOGRAM: Primary | ICD-10-CM

## 2019-01-23 ENCOUNTER — HOSPITAL ENCOUNTER (OUTPATIENT)
Dept: MAMMOGRAPHY | Age: 64
Discharge: HOME | End: 2019-01-23
Attending: SURGERY
Payer: COMMERCIAL

## 2019-01-23 DIAGNOSIS — N63.20 LEFT BREAST MASS: Primary | ICD-10-CM

## 2019-01-23 DIAGNOSIS — R92.8 ABNORMAL MAMMOGRAM: ICD-10-CM

## 2019-01-23 DIAGNOSIS — N63.20 LEFT BREAST MASS: ICD-10-CM

## 2019-01-23 DIAGNOSIS — N63.20 BREAST MASS, LEFT: ICD-10-CM

## 2019-01-23 PROCEDURE — A4648 IMPLANTABLE TISSUE MARKER: HCPCS

## 2019-01-23 PROCEDURE — 77065 DX MAMMO INCL CAD UNI: CPT

## 2019-01-23 PROCEDURE — 76642 ULTRASOUND BREAST LIMITED: CPT

## 2019-01-23 PROCEDURE — 74011000250 HC RX REV CODE- 250: Performed by: RADIOLOGY

## 2019-01-23 PROCEDURE — 74011250636 HC RX REV CODE- 250/636: Performed by: RADIOLOGY

## 2019-01-23 RX ORDER — LIDOCAINE HYDROCHLORIDE AND EPINEPHRINE 10; 10 MG/ML; UG/ML
8 INJECTION, SOLUTION INFILTRATION; PERINEURAL ONCE
Status: COMPLETED | OUTPATIENT
Start: 2019-01-23 | End: 2019-01-23

## 2019-01-23 RX ORDER — LIDOCAINE HYDROCHLORIDE 10 MG/ML
8 INJECTION INFILTRATION; PERINEURAL
Status: COMPLETED | OUTPATIENT
Start: 2019-01-23 | End: 2019-01-23

## 2019-01-23 RX ORDER — SODIUM BICARBONATE 42 MG/ML
4 INJECTION, SOLUTION INTRAVENOUS
Status: COMPLETED | OUTPATIENT
Start: 2019-01-23 | End: 2019-01-23

## 2019-01-23 RX ADMIN — LIDOCAINE HYDROCHLORIDE AND EPINEPHRINE 80 MG: 10; 10 INJECTION, SOLUTION INFILTRATION; PERINEURAL at 11:28

## 2019-01-23 RX ADMIN — LIDOCAINE HYDROCHLORIDE 8 ML: 10 INJECTION, SOLUTION INFILTRATION; PERINEURAL at 11:28

## 2019-01-23 RX ADMIN — SODIUM BICARBONATE 168 MG: 42 SOLUTION INTRAVENOUS at 11:28

## 2019-01-23 NOTE — PROGRESS NOTES
Patient received for left breast biopsy for mass seen on ultrasound. Procedure explained to patient as well as risks associated with procedure. Post biopsy discharge instructions reviewed with patient. Patient prefers to be contacted by phone with pathology results.

## 2019-01-28 ENCOUNTER — TELEPHONE (OUTPATIENT)
Dept: SURGERY | Age: 64
End: 2019-01-28

## 2019-01-30 DIAGNOSIS — C50.012 CANCER OF NIPPLE OF LEFT BREAST (HCC): Primary | ICD-10-CM

## 2019-02-05 ENCOUNTER — HOSPITAL ENCOUNTER (OUTPATIENT)
Dept: PREADMISSION TESTING | Age: 64
Discharge: HOME | End: 2019-02-05
Payer: COMMERCIAL

## 2019-02-05 VITALS
HEIGHT: 61 IN | RESPIRATION RATE: 20 BRPM | DIASTOLIC BLOOD PRESSURE: 82 MMHG | BODY MASS INDEX: 36.84 KG/M2 | WEIGHT: 195.11 LBS | TEMPERATURE: 98.7 F | HEART RATE: 106 BPM | OXYGEN SATURATION: 95 % | SYSTOLIC BLOOD PRESSURE: 145 MMHG

## 2019-02-05 LAB
ANION GAP SERPL CALC-SCNC: 12 MMOL/L (ref 5–15)
BUN SERPL-MCNC: 13 MG/DL (ref 6–20)
BUN/CREAT SERPL: 14 (ref 12–20)
CALCIUM SERPL-MCNC: 9.8 MG/DL (ref 8.5–10.1)
CHLORIDE SERPL-SCNC: 100 MMOL/L (ref 97–108)
CO2 SERPL-SCNC: 29 MMOL/L (ref 21–32)
CREAT SERPL-MCNC: 0.9 MG/DL (ref 0.55–1.02)
GLUCOSE SERPL-MCNC: 92 MG/DL (ref 65–100)
POTASSIUM SERPL-SCNC: 4 MMOL/L (ref 3.5–5.1)
SODIUM SERPL-SCNC: 141 MMOL/L (ref 136–145)

## 2019-02-05 PROCEDURE — 80048 BASIC METABOLIC PNL TOTAL CA: CPT

## 2019-02-05 PROCEDURE — 36415 COLL VENOUS BLD VENIPUNCTURE: CPT

## 2019-02-05 PROCEDURE — 93005 ELECTROCARDIOGRAM TRACING: CPT

## 2019-02-05 RX ORDER — ACETAMINOPHEN, DIPHENHYDRAMINE HCL, PHENYLEPHRINE HCL 325; 25; 5 MG/1; MG/1; MG/1
10 TABLET ORAL
COMMUNITY
End: 2019-06-26

## 2019-02-05 RX ORDER — DEXTROAMPHETAMINE SACCHARATE, AMPHETAMINE ASPARTATE, DEXTROAMPHETAMINE SULFATE AND AMPHETAMINE SULFATE 5; 5; 5; 5 MG/1; MG/1; MG/1; MG/1
20 TABLET ORAL 2 TIMES DAILY
COMMUNITY

## 2019-02-05 RX ORDER — ALPRAZOLAM 1 MG/1
2 TABLET ORAL
COMMUNITY
End: 2019-06-26

## 2019-02-05 RX ORDER — IBUPROFEN 200 MG/1
600 TABLET, COATED ORAL
COMMUNITY

## 2019-02-05 NOTE — PERIOP NOTES
[address] [facility]                  
[address] [facility], [id number] [place] [address] MAIN OR                                  [phone] MAIN PRE OP                          [phone]                                                                                AMBULATORY PRE OP          [phone] PRE-ADMISSION TESTING    21  Surgery Date:   Tuesday 2/12/19 Is surgery arrival time given by surgeon? Yes - 7am 
 
 
INSTRUCTIONS BEFORE YOUR SURGERY When You 
Arrive Arrive at the 2nd [address] [facility] on the day of your surgery Have your insurance card, photo ID, and any copayment (if needed) Food 
 and  
Drink NO food or drink after midnight the night before surgery This means NO water, gum, mints, coffee, juice, etc. 
No alcohol (beer, wine, liquor) 24 hours before and after surgery Medications to TAKE Morning of Surgery MEDICATIONS TO TAKE THE MORNING OF SURGERY WITH A SIP OF WATER:  
? Nexium, lexapro, levothyroxine Medications To 
STOP      7 days before surgery ? Non-Steroidal anti-inflammatory Drugs (NSAID's): for example, Ibuprofen (Advil, Motrin), Naproxen (Aleve) ? Aspirin, if taking for pain ? Herbal supplements, vitamins, and fish oil 
? Other: 
(Pain medications not listed above, including Tylenol may be taken) Blood Thinners ? Bathing Clothing Jewelry Valuables ? If you shower the morning of surgery, please do not apply anything to your skin (lotions, powders, deodorant, or makeup, especially mascara) ? ? Do not shave or trim anywhere 24 hours before surgery ? Wear your hair loose or down; no pony-tails, buns, or metal hair clips ? Wear loose, comfortable, clean clothes ? Wear glasses instead of contacts ? Leave money, valuables, and jewelry, including body piercings, at home Going Home       or Spending the Night ?  SAME-DAY SURGERY: You must have a responsible adult drive you home and stay with you 24 hours after surgery ? ADMITS: If your doctor is keeping you into the hospital after surgery, leave personal belongings/luggage in your car until you have a hospital room number. Hospital discharge time is 12 noon Drivers must be here before 12 noon unless you are told differently Special Instructions Free  parking 7am-5pm, bring CPAP day of surgery Follow all instructions so your surgery wont be cancelled. Please, be on time. If a situation occurs and you are delayed the day of surgery, call [phone]. If your physical condition changes (like a fever, cold, flu, etc.) call your surgeon. The patient was contacted  in person. The patient verbalizes understanding of all instructions and does not  need reinforcement.

## 2019-02-05 NOTE — PERIOP NOTES
Patient here for PAT, DOS 2/12/19. When reviewing consent, patient states that she has only discussed the procedure briefly on the phone with Dr. Donita Nguyen, consent to be signed DOS.

## 2019-02-06 ENCOUNTER — HOSPITAL ENCOUNTER (OUTPATIENT)
Dept: NON INVASIVE DIAGNOSTICS | Age: 64
Discharge: HOME | End: 2019-02-06
Attending: ANESTHESIOLOGY

## 2019-02-06 LAB
ATRIAL RATE: 96 BPM
CALCULATED P AXIS, ECG09: 53 DEGREES
CALCULATED R AXIS, ECG10: 35 DEGREES
CALCULATED T AXIS, ECG11: 136 DEGREES
DIAGNOSIS, 93000: NORMAL
P-R INTERVAL, ECG05: 130 MS
Q-T INTERVAL, ECG07: 356 MS
QRS DURATION, ECG06: 84 MS
QTC CALCULATION (BEZET), ECG08: 449 MS
VENTRICULAR RATE, ECG03: 96 BPM

## 2019-02-11 ENCOUNTER — ANESTHESIA EVENT (OUTPATIENT)
Dept: SURGERY | Age: 64
End: 2019-02-11
Payer: COMMERCIAL

## 2019-02-12 ENCOUNTER — HOSPITAL ENCOUNTER (OUTPATIENT)
Age: 64
Setting detail: OUTPATIENT SURGERY
Discharge: HOME | End: 2019-02-12
Attending: SURGERY | Admitting: SURGERY
Payer: COMMERCIAL

## 2019-02-12 ENCOUNTER — ANESTHESIA (OUTPATIENT)
Dept: SURGERY | Age: 64
End: 2019-02-12
Payer: COMMERCIAL

## 2019-02-12 VITALS
RESPIRATION RATE: 22 BRPM | DIASTOLIC BLOOD PRESSURE: 71 MMHG | TEMPERATURE: 97.7 F | HEART RATE: 97 BPM | SYSTOLIC BLOOD PRESSURE: 125 MMHG | OXYGEN SATURATION: 94 %

## 2019-02-12 DIAGNOSIS — D24.2 BENIGN PHYLLODES TUMOR OF LEFT BREAST: Primary | ICD-10-CM

## 2019-02-12 DIAGNOSIS — C50.012 CANCER OF NIPPLE OF LEFT BREAST (HCC): ICD-10-CM

## 2019-02-12 PROCEDURE — 77030020782 HC GWN BAIR PAWS FLX 3M -B

## 2019-02-12 PROCEDURE — 74011000250 HC RX REV CODE- 250: Performed by: SURGERY

## 2019-02-12 PROCEDURE — 77030031139 HC SUT VCRL2 J&J -A: Performed by: SURGERY

## 2019-02-12 PROCEDURE — 77030020143 HC AIRWY LARYN INTUB CGAS -A: Performed by: ANESTHESIOLOGY

## 2019-02-12 PROCEDURE — 76210000034 HC AMBSU PH I REC 0.5 TO 1 HR: Performed by: SURGERY

## 2019-02-12 PROCEDURE — 74011250636 HC RX REV CODE- 250/636: Performed by: ANESTHESIOLOGY

## 2019-02-12 PROCEDURE — 74011250636 HC RX REV CODE- 250/636

## 2019-02-12 PROCEDURE — 76060000061 HC AMB SURG ANES 0.5 TO 1 HR: Performed by: SURGERY

## 2019-02-12 PROCEDURE — 77030039266 HC ADH SKN EXOFIN S2SG -A: Performed by: SURGERY

## 2019-02-12 PROCEDURE — 76210000046 HC AMBSU PH II REC FIRST 0.5 HR: Performed by: SURGERY

## 2019-02-12 PROCEDURE — 77030002996 HC SUT SLK J&J -A: Performed by: SURGERY

## 2019-02-12 PROCEDURE — 76030000000 HC AMB SURG OR TIME 0.5 TO 1: Performed by: SURGERY

## 2019-02-12 PROCEDURE — 88307 TISSUE EXAM BY PATHOLOGIST: CPT

## 2019-02-12 PROCEDURE — 77030011267 HC ELECTRD BLD COVD -A: Performed by: SURGERY

## 2019-02-12 PROCEDURE — 77030018836 HC SOL IRR NACL ICUM -A: Performed by: SURGERY

## 2019-02-12 PROCEDURE — 77030032490 HC SLV COMPR SCD KNE COVD -B: Performed by: SURGERY

## 2019-02-12 PROCEDURE — 77030002933 HC SUT MCRYL J&J -A: Performed by: SURGERY

## 2019-02-12 RX ORDER — SODIUM CHLORIDE, SODIUM LACTATE, POTASSIUM CHLORIDE, CALCIUM CHLORIDE 600; 310; 30; 20 MG/100ML; MG/100ML; MG/100ML; MG/100ML
75 INJECTION, SOLUTION INTRAVENOUS CONTINUOUS
Status: DISCONTINUED | OUTPATIENT
Start: 2019-02-12 | End: 2019-02-12 | Stop reason: HOSPADM

## 2019-02-12 RX ORDER — LIDOCAINE HYDROCHLORIDE 10 MG/ML
0.1 INJECTION, SOLUTION EPIDURAL; INFILTRATION; INTRACAUDAL; PERINEURAL AS NEEDED
Status: DISCONTINUED | OUTPATIENT
Start: 2019-02-12 | End: 2019-02-12 | Stop reason: HOSPADM

## 2019-02-12 RX ORDER — SODIUM CHLORIDE 0.9 % (FLUSH) 0.9 %
5-40 SYRINGE (ML) INJECTION AS NEEDED
Status: DISCONTINUED | OUTPATIENT
Start: 2019-02-12 | End: 2019-02-12 | Stop reason: HOSPADM

## 2019-02-12 RX ORDER — SODIUM CHLORIDE, SODIUM LACTATE, POTASSIUM CHLORIDE, CALCIUM CHLORIDE 600; 310; 30; 20 MG/100ML; MG/100ML; MG/100ML; MG/100ML
100 INJECTION, SOLUTION INTRAVENOUS CONTINUOUS
Status: DISCONTINUED | OUTPATIENT
Start: 2019-02-12 | End: 2019-02-12 | Stop reason: HOSPADM

## 2019-02-12 RX ORDER — MIDAZOLAM HYDROCHLORIDE 1 MG/ML
INJECTION, SOLUTION INTRAMUSCULAR; INTRAVENOUS AS NEEDED
Status: DISCONTINUED | OUTPATIENT
Start: 2019-02-12 | End: 2019-02-12 | Stop reason: HOSPADM

## 2019-02-12 RX ORDER — LIDOCAINE HYDROCHLORIDE 20 MG/ML
INJECTION, SOLUTION EPIDURAL; INFILTRATION; INTRACAUDAL; PERINEURAL AS NEEDED
Status: DISCONTINUED | OUTPATIENT
Start: 2019-02-12 | End: 2019-02-12 | Stop reason: HOSPADM

## 2019-02-12 RX ORDER — LIDOCAINE HYDROCHLORIDE AND EPINEPHRINE 10; 10 MG/ML; UG/ML
30 INJECTION, SOLUTION INFILTRATION; PERINEURAL ONCE
Status: CANCELLED | OUTPATIENT
Start: 2019-02-12 | End: 2019-02-12

## 2019-02-12 RX ORDER — SODIUM CHLORIDE 0.9 % (FLUSH) 0.9 %
5-40 SYRINGE (ML) INJECTION EVERY 8 HOURS
Status: DISCONTINUED | OUTPATIENT
Start: 2019-02-12 | End: 2019-02-12 | Stop reason: HOSPADM

## 2019-02-12 RX ORDER — BUPIVACAINE HYDROCHLORIDE AND EPINEPHRINE 5; 5 MG/ML; UG/ML
30 INJECTION, SOLUTION EPIDURAL; INTRACAUDAL; PERINEURAL ONCE
Status: CANCELLED | OUTPATIENT
Start: 2019-02-12 | End: 2019-02-12

## 2019-02-12 RX ORDER — FENTANYL CITRATE 50 UG/ML
INJECTION, SOLUTION INTRAMUSCULAR; INTRAVENOUS AS NEEDED
Status: DISCONTINUED | OUTPATIENT
Start: 2019-02-12 | End: 2019-02-12 | Stop reason: HOSPADM

## 2019-02-12 RX ORDER — ONDANSETRON 2 MG/ML
INJECTION INTRAMUSCULAR; INTRAVENOUS AS NEEDED
Status: DISCONTINUED | OUTPATIENT
Start: 2019-02-12 | End: 2019-02-12 | Stop reason: HOSPADM

## 2019-02-12 RX ORDER — ONDANSETRON 2 MG/ML
4 INJECTION INTRAMUSCULAR; INTRAVENOUS AS NEEDED
Status: DISCONTINUED | OUTPATIENT
Start: 2019-02-12 | End: 2019-02-12 | Stop reason: HOSPADM

## 2019-02-12 RX ORDER — PROPOFOL 10 MG/ML
INJECTION, EMULSION INTRAVENOUS AS NEEDED
Status: DISCONTINUED | OUTPATIENT
Start: 2019-02-12 | End: 2019-02-12 | Stop reason: HOSPADM

## 2019-02-12 RX ORDER — SODIUM CHLORIDE, SODIUM LACTATE, POTASSIUM CHLORIDE, CALCIUM CHLORIDE 600; 310; 30; 20 MG/100ML; MG/100ML; MG/100ML; MG/100ML
125 INJECTION, SOLUTION INTRAVENOUS CONTINUOUS
Status: DISCONTINUED | OUTPATIENT
Start: 2019-02-12 | End: 2019-02-12 | Stop reason: HOSPADM

## 2019-02-12 RX ORDER — DIPHENHYDRAMINE HYDROCHLORIDE 50 MG/ML
12.5 INJECTION, SOLUTION INTRAMUSCULAR; INTRAVENOUS AS NEEDED
Status: DISCONTINUED | OUTPATIENT
Start: 2019-02-12 | End: 2019-02-12 | Stop reason: HOSPADM

## 2019-02-12 RX ORDER — HYDROCODONE BITARTRATE AND ACETAMINOPHEN 7.5; 325 MG/1; MG/1
1 TABLET ORAL
Qty: 25 TAB | Refills: 0 | Status: SHIPPED | OUTPATIENT
Start: 2019-02-12 | End: 2019-03-20 | Stop reason: ALTCHOICE

## 2019-02-12 RX ORDER — DEXAMETHASONE SODIUM PHOSPHATE 4 MG/ML
INJECTION, SOLUTION INTRA-ARTICULAR; INTRALESIONAL; INTRAMUSCULAR; INTRAVENOUS; SOFT TISSUE AS NEEDED
Status: DISCONTINUED | OUTPATIENT
Start: 2019-02-12 | End: 2019-02-12 | Stop reason: HOSPADM

## 2019-02-12 RX ORDER — HYDROMORPHONE HYDROCHLORIDE 2 MG/ML
.25-1 INJECTION, SOLUTION INTRAMUSCULAR; INTRAVENOUS; SUBCUTANEOUS
Status: DISCONTINUED | OUTPATIENT
Start: 2019-02-12 | End: 2019-02-12 | Stop reason: HOSPADM

## 2019-02-12 RX ADMIN — FENTANYL CITRATE 25 MCG: 50 INJECTION, SOLUTION INTRAMUSCULAR; INTRAVENOUS at 08:33

## 2019-02-12 RX ADMIN — FENTANYL CITRATE 25 MCG: 50 INJECTION, SOLUTION INTRAMUSCULAR; INTRAVENOUS at 08:22

## 2019-02-12 RX ADMIN — DEXAMETHASONE SODIUM PHOSPHATE 8 MG: 4 INJECTION, SOLUTION INTRA-ARTICULAR; INTRALESIONAL; INTRAMUSCULAR; INTRAVENOUS; SOFT TISSUE at 08:16

## 2019-02-12 RX ADMIN — LIDOCAINE HYDROCHLORIDE 80 MG: 20 INJECTION, SOLUTION EPIDURAL; INFILTRATION; INTRACAUDAL; PERINEURAL at 08:12

## 2019-02-12 RX ADMIN — ONDANSETRON 4 MG: 2 INJECTION INTRAMUSCULAR; INTRAVENOUS at 08:44

## 2019-02-12 RX ADMIN — FENTANYL CITRATE 50 MCG: 50 INJECTION, SOLUTION INTRAMUSCULAR; INTRAVENOUS at 08:12

## 2019-02-12 RX ADMIN — MIDAZOLAM HYDROCHLORIDE 2 MG: 1 INJECTION, SOLUTION INTRAMUSCULAR; INTRAVENOUS at 08:04

## 2019-02-12 RX ADMIN — PROPOFOL 30 MG: 10 INJECTION, EMULSION INTRAVENOUS at 08:33

## 2019-02-12 RX ADMIN — PROPOFOL 170 MG: 10 INJECTION, EMULSION INTRAVENOUS at 08:12

## 2019-02-12 RX ADMIN — FENTANYL CITRATE 25 MCG: 50 INJECTION, SOLUTION INTRAMUSCULAR; INTRAVENOUS at 08:37

## 2019-02-12 RX ADMIN — FENTANYL CITRATE 25 MCG: 50 INJECTION, SOLUTION INTRAMUSCULAR; INTRAVENOUS at 08:53

## 2019-02-12 RX ADMIN — SODIUM CHLORIDE, SODIUM LACTATE, POTASSIUM CHLORIDE, AND CALCIUM CHLORIDE 75 ML/HR: 600; 310; 30; 20 INJECTION, SOLUTION INTRAVENOUS at 07:52

## 2019-02-12 NOTE — ANESTHESIA POSTPROCEDURE EVALUATION
Procedure(s): LEFT BREAST LUMPECTOMY WITH ULTRASOUND. Anesthesia Post Evaluation Multimodal analgesia: multimodal analgesia not used between 6 hours prior to anesthesia start to PACU discharge Patient location during evaluation: PACU Patient participation: complete - patient participated Level of consciousness: awake Pain management: adequate Airway patency: patent Anesthetic complications: no 
Cardiovascular status: acceptable, blood pressure returned to baseline and hemodynamically stable Respiratory status: acceptable Hydration status: acceptable Visit Vitals /71 Pulse 97 Temp 36.5 °C (97.7 °F) Resp 22 SpO2 94%

## 2019-02-12 NOTE — DISCHARGE INSTRUCTIONS
DISCHARGE SUMMARY from your Nurse      PATIENT INSTRUCTIONS    After general anesthesia or intravenous sedation, for 24 hours or while taking prescription Narcotics:  · Limit your activities  · Do not drive and operate hazardous machinery  · Do not make important personal or business decisions  · Do  not drink alcoholic beverages  · If you have not urinated within 8 hours after discharge, please contact your surgeon on call. Report the following to your surgeon:  · Excessive pain, swelling, redness or odor of or around the surgical area  · Temperature over 100.5  · Nausea and vomiting lasting longer than 4 hours or if unable to take medications  · Any signs of decreased circulation or nerve impairment to extremity: change in color, persistent  numbness, tingling, coldness or increase pain  · Any questions      COUGH AND DEEP BREATHE    Breathing deeply and coughing are very important exercises to do after surgery. Deep breathing and coughing open the little air tubes and air sacks in your lungs. You take deep breaths every day. You may not even notice - it is just something you do when you sigh or yawn. It is a natural exercise you do to keep these air passages open. After surgery, take deep breaths and cough, on purpose. DIRECTIONS:  · Take 10 to 15 slow deep breaths every hour while awake. · Breathe in deeply, and hold it for 2 seconds. · Exhale slowly through puckered lips, like blowing up a balloon. · After every 4th or 5th deep breath, hug your pillow to your chest or belly and give a hard, deep cough. Yes, it will probably hurt. But doing this exercise is a very important part of healing after surgery. Take your pain medicine to help you do this exercise without too much pain. Coughing and deep breathing help prevent bronchitis and pneumonia after surgery.   If you had chest or belly surgery, use a pillow as a \"hug buddy\" and hold it tightly to your chest or belly when you cough.       ANKLE PUMPS    Ankle pumps increase the circulation of oxygenated blood to your lower extremities and decrease your risk for circulation problems such as blood clots. They also stretch the muscles, tendons and ligaments in your foot and ankle, and prevent joint contracture in the ankle and foot, especially after surgeries on the legs. It is important to do ankle pump exercises regularly after surgery because immobility increases your risk for developing a blood clot. Your doctor may also have you take an Aspirin for the next few days as well. If your doctor did not ask you to take an Aspirin, consult with him before starting Aspirin therapy on your own. The exercise is quite simple. · Slowly point your foot forward, feeling the muscles on the top of your lower leg stretch, and hold this position for 5 seconds. · Next, pull your foot back toward you as far as possible, stretching the calf muscles, and hold that position for 5 seconds. · Repeat with the other foot. · Perform 10 repetitions every hour while awake for both ankles if possible (down and then up with the foot once is one repetition). You should feel gentle stretching of the muscles in your lower leg when doing this exercise. If you feel pain, or your range of motion is limited, don't push too hard. Only go the limit your joint and muscles will let you go. If you have increasing pain, progressively worsening leg warmth or swelling, STOP the exercise and call your doctor. MEDICATION AND   SIDE EFFECT GUIDE    The [facility] MEDICATION AND SIDE EFFECT GUIDE was provided to the PATIENT AND CARE PROVIDER. Information provided includes instruction about drug purpose and common side effects for the following medications:   · [address]        These are general instructions for a healthy lifestyle:    *   Please give a list of your current medications to your Primary Care Provider.   * Please update this list whenever your medications are discontinued, doses are changed, or new medications (including over-the-counter products) are added. *   Please carry medication information at all times in case of emergency situations. About Smoking  No smoking / No tobacco products  Avoid exposure to second hand smoke     Surgeon General's Warning:  Quitting smoking now greatly reduces serious risk to your health. Obesity, smoking, and sedentary lifestyle greatly increases your risk for illness and disease. A healthy diet, regular physical exercise & weight monitoring are important for maintaining a healthy lifestyle. Congestive Heart Failure  You may be retaining fluid if you have a history of heart failure or if you experience any of the following symptoms:  Weight gain of 3 pounds or more overnight or 5 pounds in a week, increased swelling in your hands or feet or shortness of breath while lying flat in bed. Please call your doctor as soon as you notice any of these symptoms; do not wait until your next office visit. Recognize signs and symptoms of STROKE:  F -  Face looks uneven  A -  Arms unable to move or move evenly  S -  Speech slurred or non-existent  T -  Time-call 911 as soon as signs and symptoms begin-DO NOT go          back to bed or wait to see if you get better-TIME IS BRAIN. Warning Signs of HEART ATTACK   Call 911 if you have these symptoms:     Chest discomfort. Most heart attacks involve discomfort in the center of the chest that lasts more than a few minutes, or that goes away and comes back. It can feel like uncomfortable pressure, squeezing, fullness, or pain.  Discomfort in other areas of the upper body. Symptoms can include pain or discomfort in one or both arms, the back, neck, jaw, or stomach.  Shortness of breath with or without chest discomfort.  Other signs may include breaking out in a cold sweat, nausea, or lightheadedness.     Don't wait more than five minutes to call 911 - MINUTES MATTER! Fast action can save your life. Calling 911 is almost always the fastest way to get lifesaving treatment. Emergency Medical Services staff can begin treatment when they arrive -- up to an hour sooner than if someone gets to the hospital by car. The discharge information has been reviewed with the patient and caregiver. Any questions and concerns from the patient and caregiver have been addressed. The patient and caregiver verbalized understanding. Other information in your discharge envelope:  [x]     PRESCRIPTIONS  []     PHYSICAL THERAPY PRESCRIPTION  []     APPOINTMENT CARDS  []     Regional Anesthesia Pamphlet for block or block with On-Q Catheter from   Anesthesia Service  []     Medical device information sheets/pamphlets from their    []     School/work excuse note. []     /parent work excuse note. The following personal items collected during your admission are returned to you:   Dental Appliance: Dental Appliances: None  Vision:    Hearing Aid:    Jewelry: Jewelry: None  Clothing: Clothing: Footwear, Pants, Shirt, Socks  Other Valuables:    Valuables sent to safe:                                Discharge Instructions from Dr. Samuels Orn    · I will call you with the pathology results, typically within 1 week from today. · You may shower, but no hot tubs, swimming pools, or baths until your incision is healed. · No heavy lifting with the affected extremity (nothing greater than 5 pounds), and limit its use for the next 4-5 days. · You may use an ice pack for comfort for the next couple of days, but do not place ice directly on the skin. Rather, use a towel or clothing to serve as a barrier between skin and ice to prevent injury. · If I placed a drain, follow the drain instructions provided, especially as you keep a record of the drain output. · Follow medication instructions carefully.   · Watch for signs of infection as listed below. · Redness  · Swelling  · Drainage from the incision or from your nipple that appears infected  · Fever over 101 degrees for consecutive readings, or over 99.5 if you are currently undergoing chemotherapy. · Call our office (number is below) for a follow-up appointment. · If you have any problems, our phone number is [phone].

## 2019-02-12 NOTE — H&P
HISTORY OF PRESENT ILLNESS Werner Jaffe is a 66 y.o. female. HPI NEW Patient presents for consultation  for LEFT breast pain below nipple where previous surgery was. Patient still feels lumps in the left breast. 
Family history is unknown. 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 Last mammogram done [facility] BIRADS 2 08/2014. 
  
Review of Systems Constitutional: Negative. HENT: Negative. Eyes: Negative. Respiratory: Negative. Cardiovascular: Negative. Gastrointestinal: Negative. Genitourinary: Negative. Musculoskeletal: Positive for myalgias and joint pain. Skin: Negative. Neurological: Negative. Endo/Heme/Allergies: Negative. Psychiatric/Behavioral: The patient is nervous/anxious.   
  
  
Physical Exam  
Cardiovascular: Normal rate and normal heart sounds. Pulmonary/Chest: Breath sounds normal. Right breast exhibits no inverted nipple, no mass, no nipple discharge, no skin change and no tenderness. Left breast exhibits no inverted nipple, no mass, no nipple discharge, no skin change and no tenderness. Breasts are symmetrical.  
 
 
Lymphadenopathy:  
     Right cervical: No superficial cervical, no deep cervical and no posterior cervical adenopathy present. Left cervical: No superficial cervical, no deep cervical and no posterior cervical adenopathy present. Right axillary: No pectoral and no lateral adenopathy present. Left axillary: No pectoral and no lateral adenopathy present. Left breast fibroepithelial lesion. Admit for excision

## 2019-02-13 ENCOUNTER — PATIENT OUTREACH (OUTPATIENT)
Dept: OTHER | Age: 64
End: 2019-02-13

## 2019-02-13 NOTE — OP NOTES
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    PROCEDURE PERFORMED:  Left lumpectomy with intraoperative ultrasound. SURGEON:  Matt Houser MD    ASSISTANT:  Fely Haq RN    ANESTHESIA:  General.    COMPLICATIONS:  None. SPECIMENS REMOVED:  Left breast tissue. IMPLANTS:  None. ESTIMATED BLOOD LOSS:  Minimal.    INDICATIONS:  The patient is a 59-year-old female who had had a benign phyllodes  tumor resected several years ago and now has what appears to be a recurrent  fibroepithelial lesion in the same area. On ultrasound, she had a mass which  appeared larger than the time of biopsy and a small satellite lesion just medial to  this in the retroareolar space. PROCEDURE:  After the satisfactory induction of general LMA anesthesia, the patient  was prepped and draped in sterile fashion. Intraoperative ultrasound was performed  and the breast was marked. A circumareolar incision was made and deepened through  subcutaneous tissue with Bovie cautery. Skin flap was raised and a lumpectomy was  performed around the palpable tumor. A specimen ultrasound was performed which did  reveal the presence of the mass and the satellite lesion within the specimen. All  dissection planes were hemostatic. The wound was anesthetized with 0.5% Marcaine. Specimen was oriented and sent to Pathology, and the wound was then closed with an  interrupted 3-0 Vicryl and a running subcuticular 4-0 Monocryl on skin. The patient  tolerated the procedure well. There were no immediate complications. She was taken  to recovery room in stable condition.       MD SHANNON [email or web address]  D:  2019 9:08  T:  02/13/2019 6:41  JOB #:  [id number]  CC:  Cira Burton MD

## 2019-02-13 NOTE — PROGRESS NOTES
Patient eligible for [facility] Employee care management. Received notification of discharge from ambulatory surgery center on 02/12/19 following left breast lumpectomy for phyllodes tumor. Contacted patient to discuss post discharge needs and offer care management services. Two patient identifiers verified. Discussed the care management program.  Patient agrees to care management services at this time. PMH:  
Past Medical History:  
Diagnosis Date  Adrenal cancer ([address] 1996  Adverse effect of anesthesia 2014 \"stopped breathing during MAC, diagnosed with sleep apnea at that time\"  Anxiety and depression  Arthritis   
 pt not aware of this diagnosis  Asthma 2000s mostly resolved  Bronchitis   
 yearly  Colon polyps  Diverticulitis  Diverticulosis  GERD (gastroesophageal reflux disease)  Hypertension  Hypothyroid  Ill-defined condition \"trouble focusing\"  Ill-defined condition   
 cyst, back of head  Kidney stones 1980s  Lipoma   
 front of right shoulder, right AC  Nausea & vomiting  Palpitations  Retinal tear left  
 partial, has floaters in vision  Shingles 12/2015  
 Skin cancer 2004  
 melanoma- L flank  Skin cancer 2006  
 melanoma- L frontal scalp  Sleep apnea   
 uses CPAP  
 Sun-damaged skin  Tanning bed exposure  Vitiligo \"melanoma induced vitiligo\" Social History:  
Social History Socioeconomic History  Marital status:  Spouse name: Not on file  Number of children: Not on file  Years of education: Not on file  Highest education level: Not on file Social Needs  Financial resource strain: Not on file  Food insecurity - worry: Not on file  Food insecurity - inability: Not on file  Transportation needs - medical: Not on file  Transportation needs - non-medical: Not on file Occupational History  Not on file Tobacco Use  
  Smoking status: Never Smoker  Smokeless tobacco: Never Used Substance and Sexual Activity  Alcohol use: Yes Comment: 1 drink/week (may be wine, beer, or liquor)  Drug use: No  
 Sexual activity: Not on file Other Topics Concern  Not on file Social History Narrative  Not on file Care management assessment completed: 
 
Surgical/Wound Condition Focused Assessment Skin- any open wounds or incisions? yes Description and location of wound- incision site left breast covered dry intact dressing; 
 Denies drainage, swelling, redness or worsening pain; In the last 24 hour have you experienced; Fever no Low body temperature no Chills or shaking no Sweating no Fast heart rate no Fast breathing no Dizziness/lightheadedness no Confusion or unusual change in mental status no Diarrhea no Nausea no Vomiting no Shortness of breath or difficulty breathing no Less urine output no Cold, clammy, and pale skin no Skin rash or skin color changes no New or worsening pain? Pain is 6-7/10, Norco prn relieves pain to 2-3/10; If yes, pain rated 0-10: Left breast, sharp at time, dull most of the time; 
New or worsening numbness or tingling? no If yes, location of numbness and tingling: NA Activity level- moving several times a day, or as recommended? yes Abnormal activity level reported: NA Bathing and showering instructions? yes Nutrition- prescribed diet? no  
Tolerating food? yes Hydration- (how much in ounces per day) 32oz water today so far; 
Medications- new antibiotic? no 
           Pain medication? Yes, Norco 
Does patient understand how and when to take their medications? yes If no, instructed patient on proper medication use? NA Does patient have incentive spirometer? no If yes, how frequently is patient using incentive spirometer? NA Red Flags: 
Notify your surgeon if you develop any of the following: ? You have pain that does not get better after you take pain medicine. ? You are sick to your stomach or cannot drink fluids. ? You cannot pass stools or gas. ? Your incision comes open ? You have signs of infection, such as: 
o Increased pain, swelling, warmth, or redness. o Red streaks leading from the incision. o Pus draining from the incision. o A fever. ? You have signs of a blood clot in your leg (called a deep vein thrombosis), such as: 
o Pain in your calf, back of the knee, thigh, or groin. o Redness or swelling in your leg. Medications: 
New Medications at Discharge:  Norco 
Changed Medications at Discharge:   None Discontinued Medications at Discharge:  None Current Outpatient Medications Medication Sig  
 HYDROcodone-acetaminophen (NORCO) 7.5-325 mg per tablet Take 1 Tab by mouth every four (4) hours as needed for Pain. Max Daily Amount: 6 Tabs.  dextroamphetamine-amphetamine (ADDERALL) 20 mg tablet Take 20 mg by mouth two (2) times a day.  ALPRAZolam (XANAX) 1 mg tablet Take 1 mg by mouth nightly.  ibuprofen (ADVIL) 200 mg tablet Take 600 mg by mouth every six (6) hours as needed for Pain.  ascorbate calcium (VITAMIN C PO) Take 1 Tab by mouth daily as needed.  ZINC PO Take 1 Tab by mouth daily as needed.  ascorbic acid/multivit-min (EMERGEN-C PO) Take 1 Package by mouth daily as needed.  CALCIUM PO Take 1 Tab by mouth daily as needed.  hydrochlorothiazide (HYDRODIURIL) 25 mg tablet Take 25 mg by mouth every morning.  esomeprazole (NEXIUM) 20 mg capsule Take 20 mg by mouth every morning.  levothyroxine (SYNTHROID) 150 mcg tablet Take 150 mcg by mouth Daily (before breakfast).  potassium chloride SR (KLOR-CON 10) 10 mEq tablet Take 10 mEq by mouth daily.  escitalopram (LEXAPRO) 20 mg tablet Take 20 mg by mouth every morning.  cholecalciferol, vitamin D3, (VITAMIN D3) 2,000 unit Tab Take 2,000 Units by mouth daily.  melatonin 10 mg tab Take 10 mg by mouth nightly as needed.  LORazepam (ATIVAN) 1 mg tablet TAKE 1 TABLET BY MOUTH TWICE A DAY AS NEEDED FOR ANXIETY No current facility-administered medications for this visit. Performed medication reconciliation with patient, and patient verbalizes understanding of administration of home medications. There were no barriers to obtaining medications identified at this time. Preventive Care Health Maintenance Topic Date Due  
 Hepatitis C Screening  1955  
 DTaP/Tdap/Td series (1 - Tdap) 12/30/1978  PAP AKA CERVICAL CYTOLOGY  12/30/1978  Shingrix Vaccine Age 50> (1 of 2) 12/30/2007  FOBT Q 1 YEAR AGE 50-75  12/30/2007  Influenza Age 5 to Adult  08/01/2018  BREAST CANCER SCRN MAMMOGRAM  01/23/2021 CM Identified  Problems 1. Acute pain 2. Risk for Infection Goals Verbalize pain control and return to prior activity level; 
Demonstrates no return to ED or red flags within 30 days; 
 
Barriers/Support system: 
patient Home health/DME in place per discharge orders:  None Barriers/Challenges to Care: []  Decline in memory    []  Language barrier    
[]  Emotional         []  Limited mobility []  Lack of motivation   [x]  Knowledge  
[] Vision, hearing or cognitive impairment    [] Financial Barriers  []  Lack of support  
[x]  Pain    []  Other    []  None PCP/Specialist follow up:  Awaiting call from surgeon's office for path results and next appt; Future Appointments Date Time Provider Anastasia Sosa 5/2/5197 23:35 AM Susana Zhou., MD [address] Reviewed red flags with patient, and patient verbalizes understanding. Patient given an opportunity to ask questions. No other clinical/social/functional needs noted. The patient agrees to contact the PCP office for questions related to their healthcare. The patient expressed thanks, offered no additional questions and ended the call. Plan for next call:  FU on incision site, path results and FU appt;

## 2019-02-18 ENCOUNTER — TELEPHONE (OUTPATIENT)
Dept: SURGERY | Age: 64
End: 2019-02-18

## 2019-02-19 DIAGNOSIS — D48.62 NEOPLASM OF UNCERTAIN BEHAVIOR OF LEFT BREAST: Primary | ICD-10-CM

## 2019-02-20 ENCOUNTER — PATIENT OUTREACH (OUTPATIENT)
Dept: OTHER | Age: 64
End: 2019-02-20

## 2019-02-20 NOTE — PROGRESS NOTES
Care Manager contacted the patient by telephone in follow up. Verified  and zip code with patient as identifiers. 19 following left breast lumpectomy for phyllodes tumor. Assessment of clinical changes and knowledge demonstrated since last call: 
   
Ongoing Plan of Care:  
Acute pain, left breast tumor excision; 
· States it is improving, less discomfort, dull ache L breast; 
· States received path results - did not have clear margins; 
· Next excision schedule on 19 different facility;  
· [facility] this time;  
  
Risk for Infection · States incision site is healing well; 
· Left breast no signs of infection; 
 
Goals Verbalize pain control and return to prior activity level; 
Demonstrates no return to ED or red flags within 30 days; Any recurrence Red Flags or continued symptoms: None; 
 
Medication Regimen Change:  None; 
Completed a review of medications with patient, who verbalized understanding of how and when to take medications. Upcoming Appointments: Wider excision, left breast, on 19; 
 
Patient asked questions appropriately and denied any additional needs at this time. Patient verbalized understanding of all information discussed. Patient has my name and contact information for any follow up needs or questions.   
 
Plan next call:  FU after next excision, doing well, appreciate diet support after procedures/healing for Lo Carb ideas;

## 2019-02-28 ENCOUNTER — ANESTHESIA (OUTPATIENT)
Dept: MEDSURG UNIT | Age: 64
End: 2019-02-28
Payer: COMMERCIAL

## 2019-02-28 ENCOUNTER — ANESTHESIA EVENT (OUTPATIENT)
Dept: MEDSURG UNIT | Age: 64
End: 2019-02-28
Payer: COMMERCIAL

## 2019-02-28 ENCOUNTER — HOSPITAL ENCOUNTER (OUTPATIENT)
Age: 64
Setting detail: OUTPATIENT SURGERY
Discharge: HOME | End: 2019-02-28
Attending: SURGERY | Admitting: SURGERY
Payer: COMMERCIAL

## 2019-02-28 VITALS
BODY MASS INDEX: 35.5 KG/M2 | RESPIRATION RATE: 18 BRPM | SYSTOLIC BLOOD PRESSURE: 111 MMHG | HEART RATE: 86 BPM | TEMPERATURE: 98.2 F | HEIGHT: 61 IN | OXYGEN SATURATION: 95 % | DIASTOLIC BLOOD PRESSURE: 60 MMHG | WEIGHT: 188 LBS

## 2019-02-28 DIAGNOSIS — D48.62 NEOPLASM OF UNCERTAIN BEHAVIOR OF LEFT BREAST: ICD-10-CM

## 2019-02-28 PROCEDURE — 88307 TISSUE EXAM BY PATHOLOGIST: CPT

## 2019-02-28 PROCEDURE — 74011000250 HC RX REV CODE- 250: Performed by: SURGERY

## 2019-02-28 PROCEDURE — 74011250636 HC RX REV CODE- 250/636

## 2019-02-28 PROCEDURE — 76060000061 HC AMB SURG ANES 0.5 TO 1 HR: Performed by: SURGERY

## 2019-02-28 PROCEDURE — 76210000034 HC AMBSU PH I REC 0.5 TO 1 HR: Performed by: SURGERY

## 2019-02-28 PROCEDURE — 77030011267 HC ELECTRD BLD COVD -A: Performed by: SURGERY

## 2019-02-28 PROCEDURE — 77030010509 HC AIRWY LMA MSK TELE -A: Performed by: ANESTHESIOLOGY

## 2019-02-28 PROCEDURE — 77030032490 HC SLV COMPR SCD KNE COVD -B: Performed by: SURGERY

## 2019-02-28 PROCEDURE — 77030002996 HC SUT SLK J&J -A: Performed by: SURGERY

## 2019-02-28 PROCEDURE — 77030040174 HC ADH SKN AFFIX MDII -B: Performed by: SURGERY

## 2019-02-28 PROCEDURE — 74011250636 HC RX REV CODE- 250/636: Performed by: ANESTHESIOLOGY

## 2019-02-28 PROCEDURE — 76030000000 HC AMB SURG OR TIME 0.5 TO 1: Performed by: SURGERY

## 2019-02-28 PROCEDURE — 77030018836 HC SOL IRR NACL ICUM -A: Performed by: SURGERY

## 2019-02-28 PROCEDURE — 76210000046 HC AMBSU PH II REC FIRST 0.5 HR: Performed by: SURGERY

## 2019-02-28 PROCEDURE — 77030011640 HC PAD GRND REM COVD -A: Performed by: SURGERY

## 2019-02-28 PROCEDURE — 77030002933 HC SUT MCRYL J&J -A: Performed by: SURGERY

## 2019-02-28 PROCEDURE — 77030020782 HC GWN BAIR PAWS FLX 3M -B

## 2019-02-28 PROCEDURE — 77030031139 HC SUT VCRL2 J&J -A: Performed by: SURGERY

## 2019-02-28 RX ORDER — MIDAZOLAM HYDROCHLORIDE 1 MG/ML
1 INJECTION, SOLUTION INTRAMUSCULAR; INTRAVENOUS AS NEEDED
Status: DISCONTINUED | OUTPATIENT
Start: 2019-02-28 | End: 2019-02-28 | Stop reason: HOSPADM

## 2019-02-28 RX ORDER — SODIUM CHLORIDE, SODIUM LACTATE, POTASSIUM CHLORIDE, CALCIUM CHLORIDE 600; 310; 30; 20 MG/100ML; MG/100ML; MG/100ML; MG/100ML
125 INJECTION, SOLUTION INTRAVENOUS CONTINUOUS
Status: DISCONTINUED | OUTPATIENT
Start: 2019-02-28 | End: 2019-02-28 | Stop reason: HOSPADM

## 2019-02-28 RX ORDER — SODIUM CHLORIDE 0.9 % (FLUSH) 0.9 %
5-40 SYRINGE (ML) INJECTION AS NEEDED
Status: DISCONTINUED | OUTPATIENT
Start: 2019-02-28 | End: 2019-02-28 | Stop reason: HOSPADM

## 2019-02-28 RX ORDER — FENTANYL CITRATE 50 UG/ML
INJECTION, SOLUTION INTRAMUSCULAR; INTRAVENOUS AS NEEDED
Status: DISCONTINUED | OUTPATIENT
Start: 2019-02-28 | End: 2019-02-28 | Stop reason: HOSPADM

## 2019-02-28 RX ORDER — LIDOCAINE HYDROCHLORIDE 10 MG/ML
0.1 INJECTION, SOLUTION EPIDURAL; INFILTRATION; INTRACAUDAL; PERINEURAL AS NEEDED
Status: DISCONTINUED | OUTPATIENT
Start: 2019-02-28 | End: 2019-02-28 | Stop reason: HOSPADM

## 2019-02-28 RX ORDER — MIDAZOLAM HYDROCHLORIDE 1 MG/ML
INJECTION, SOLUTION INTRAMUSCULAR; INTRAVENOUS AS NEEDED
Status: DISCONTINUED | OUTPATIENT
Start: 2019-02-28 | End: 2019-02-28 | Stop reason: HOSPADM

## 2019-02-28 RX ORDER — PROPOFOL 10 MG/ML
INJECTION, EMULSION INTRAVENOUS AS NEEDED
Status: DISCONTINUED | OUTPATIENT
Start: 2019-02-28 | End: 2019-02-28 | Stop reason: HOSPADM

## 2019-02-28 RX ORDER — DEXAMETHASONE SODIUM PHOSPHATE 4 MG/ML
INJECTION, SOLUTION INTRA-ARTICULAR; INTRALESIONAL; INTRAMUSCULAR; INTRAVENOUS; SOFT TISSUE AS NEEDED
Status: DISCONTINUED | OUTPATIENT
Start: 2019-02-28 | End: 2019-02-28 | Stop reason: HOSPADM

## 2019-02-28 RX ORDER — BUPIVACAINE HYDROCHLORIDE AND EPINEPHRINE 5; 5 MG/ML; UG/ML
30 INJECTION, SOLUTION EPIDURAL; INTRACAUDAL; PERINEURAL ONCE
Status: DISCONTINUED | OUTPATIENT
Start: 2019-02-28 | End: 2019-02-28 | Stop reason: HOSPADM

## 2019-02-28 RX ORDER — LIDOCAINE HYDROCHLORIDE AND EPINEPHRINE 10; 10 MG/ML; UG/ML
30 INJECTION, SOLUTION INFILTRATION; PERINEURAL ONCE
Status: DISCONTINUED | OUTPATIENT
Start: 2019-02-28 | End: 2019-02-28 | Stop reason: HOSPADM

## 2019-02-28 RX ORDER — FENTANYL CITRATE 50 UG/ML
25 INJECTION, SOLUTION INTRAMUSCULAR; INTRAVENOUS
Status: DISCONTINUED | OUTPATIENT
Start: 2019-02-28 | End: 2019-02-28 | Stop reason: HOSPADM

## 2019-02-28 RX ORDER — SODIUM CHLORIDE, SODIUM LACTATE, POTASSIUM CHLORIDE, CALCIUM CHLORIDE 600; 310; 30; 20 MG/100ML; MG/100ML; MG/100ML; MG/100ML
INJECTION, SOLUTION INTRAVENOUS
Status: DISCONTINUED | OUTPATIENT
Start: 2019-02-28 | End: 2019-02-28 | Stop reason: HOSPADM

## 2019-02-28 RX ORDER — SODIUM CHLORIDE 0.9 % (FLUSH) 0.9 %
5-40 SYRINGE (ML) INJECTION EVERY 8 HOURS
Status: DISCONTINUED | OUTPATIENT
Start: 2019-02-28 | End: 2019-02-28 | Stop reason: HOSPADM

## 2019-02-28 RX ORDER — SODIUM CHLORIDE, SODIUM LACTATE, POTASSIUM CHLORIDE, CALCIUM CHLORIDE 600; 310; 30; 20 MG/100ML; MG/100ML; MG/100ML; MG/100ML
75 INJECTION, SOLUTION INTRAVENOUS CONTINUOUS
Status: DISCONTINUED | OUTPATIENT
Start: 2019-02-28 | End: 2019-02-28 | Stop reason: HOSPADM

## 2019-02-28 RX ORDER — LIDOCAINE HYDROCHLORIDE 20 MG/ML
INJECTION, SOLUTION EPIDURAL; INFILTRATION; INTRACAUDAL; PERINEURAL AS NEEDED
Status: DISCONTINUED | OUTPATIENT
Start: 2019-02-28 | End: 2019-02-28 | Stop reason: HOSPADM

## 2019-02-28 RX ORDER — FENTANYL CITRATE 50 UG/ML
50 INJECTION, SOLUTION INTRAMUSCULAR; INTRAVENOUS AS NEEDED
Status: DISCONTINUED | OUTPATIENT
Start: 2019-02-28 | End: 2019-02-28 | Stop reason: HOSPADM

## 2019-02-28 RX ORDER — DIPHENHYDRAMINE HYDROCHLORIDE 50 MG/ML
12.5 INJECTION, SOLUTION INTRAMUSCULAR; INTRAVENOUS AS NEEDED
Status: DISCONTINUED | OUTPATIENT
Start: 2019-02-28 | End: 2019-02-28 | Stop reason: HOSPADM

## 2019-02-28 RX ORDER — SODIUM CHLORIDE 9 MG/ML
25 INJECTION, SOLUTION INTRAVENOUS CONTINUOUS
Status: DISCONTINUED | OUTPATIENT
Start: 2019-02-28 | End: 2019-02-28 | Stop reason: HOSPADM

## 2019-02-28 RX ORDER — ROPIVACAINE HYDROCHLORIDE 5 MG/ML
30 INJECTION, SOLUTION EPIDURAL; INFILTRATION; PERINEURAL ONCE
Status: DISCONTINUED | OUTPATIENT
Start: 2019-02-28 | End: 2019-02-28 | Stop reason: HOSPADM

## 2019-02-28 RX ORDER — ONDANSETRON 2 MG/ML
INJECTION INTRAMUSCULAR; INTRAVENOUS AS NEEDED
Status: DISCONTINUED | OUTPATIENT
Start: 2019-02-28 | End: 2019-02-28 | Stop reason: HOSPADM

## 2019-02-28 RX ORDER — ACETAMINOPHEN 10 MG/ML
INJECTION, SOLUTION INTRAVENOUS AS NEEDED
Status: DISCONTINUED | OUTPATIENT
Start: 2019-02-28 | End: 2019-02-28 | Stop reason: HOSPADM

## 2019-02-28 RX ORDER — HYDROMORPHONE HYDROCHLORIDE 1 MG/ML
0.2 INJECTION, SOLUTION INTRAMUSCULAR; INTRAVENOUS; SUBCUTANEOUS
Status: DISCONTINUED | OUTPATIENT
Start: 2019-02-28 | End: 2019-02-28 | Stop reason: HOSPADM

## 2019-02-28 RX ORDER — HYDROCODONE BITARTRATE AND ACETAMINOPHEN 7.5; 325 MG/1; MG/1
1 TABLET ORAL
Qty: 35 TAB | Refills: 0 | Status: SHIPPED | OUTPATIENT
Start: 2019-02-28 | End: 2019-03-07

## 2019-02-28 RX ORDER — ONDANSETRON 2 MG/ML
4 INJECTION INTRAMUSCULAR; INTRAVENOUS AS NEEDED
Status: DISCONTINUED | OUTPATIENT
Start: 2019-02-28 | End: 2019-02-28 | Stop reason: HOSPADM

## 2019-02-28 RX ORDER — MORPHINE SULFATE 10 MG/ML
2 INJECTION, SOLUTION INTRAMUSCULAR; INTRAVENOUS
Status: DISCONTINUED | OUTPATIENT
Start: 2019-02-28 | End: 2019-02-28 | Stop reason: HOSPADM

## 2019-02-28 RX ORDER — MIDAZOLAM HYDROCHLORIDE 1 MG/ML
0.5 INJECTION, SOLUTION INTRAMUSCULAR; INTRAVENOUS
Status: DISCONTINUED | OUTPATIENT
Start: 2019-02-28 | End: 2019-02-28 | Stop reason: HOSPADM

## 2019-02-28 RX ADMIN — PROPOFOL 200 MG: 10 INJECTION, EMULSION INTRAVENOUS at 08:51

## 2019-02-28 RX ADMIN — FENTANYL CITRATE 25 MCG: 50 INJECTION, SOLUTION INTRAMUSCULAR; INTRAVENOUS at 09:01

## 2019-02-28 RX ADMIN — ONDANSETRON 4 MG: 2 INJECTION INTRAMUSCULAR; INTRAVENOUS at 09:01

## 2019-02-28 RX ADMIN — MIDAZOLAM HYDROCHLORIDE 2 MG: 1 INJECTION, SOLUTION INTRAMUSCULAR; INTRAVENOUS at 08:44

## 2019-02-28 RX ADMIN — MIDAZOLAM HYDROCHLORIDE 3 MG: 1 INJECTION, SOLUTION INTRAMUSCULAR; INTRAVENOUS at 08:40

## 2019-02-28 RX ADMIN — ACETAMINOPHEN 1000 MG: 10 INJECTION, SOLUTION INTRAVENOUS at 08:59

## 2019-02-28 RX ADMIN — DEXAMETHASONE SODIUM PHOSPHATE 8 MG: 4 INJECTION, SOLUTION INTRA-ARTICULAR; INTRALESIONAL; INTRAMUSCULAR; INTRAVENOUS; SOFT TISSUE at 09:01

## 2019-02-28 RX ADMIN — PROPOFOL 20 MG: 10 INJECTION, EMULSION INTRAVENOUS at 09:14

## 2019-02-28 RX ADMIN — SODIUM CHLORIDE, SODIUM LACTATE, POTASSIUM CHLORIDE, CALCIUM CHLORIDE: 600; 310; 30; 20 INJECTION, SOLUTION INTRAVENOUS at 08:40

## 2019-02-28 RX ADMIN — SODIUM CHLORIDE, SODIUM LACTATE, POTASSIUM CHLORIDE, AND CALCIUM CHLORIDE 125 ML/HR: 600; 310; 30; 20 INJECTION, SOLUTION INTRAVENOUS at 08:00

## 2019-02-28 RX ADMIN — LIDOCAINE HYDROCHLORIDE 40 MG: 20 INJECTION, SOLUTION EPIDURAL; INFILTRATION; INTRACAUDAL; PERINEURAL at 08:51

## 2019-02-28 RX ADMIN — PROPOFOL 60 MG: 10 INJECTION, EMULSION INTRAVENOUS at 09:17

## 2019-02-28 NOTE — H&P
HISTORY OF PRESENT ILLNESS Silvia Baez is a 66 y.o. female. HPI NEW Patient presents for consultation  for LEFT breast pain below nipple where previous surgery was. Patient still feels lumps in the left breast. 
Family history is unknown. 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 Last mammogram done [facility] B[facility] 2 08/2014. 
  
Review of Systems Constitutional: Negative.   
HENT: Negative.   
Eyes: Negative.   
Respiratory: Negative.   
Cardiovascular: Negative.   
Gastrointestinal: Negative.   
Genitourinary: Negative.   
Musculoskeletal: Positive for myalgias and joint pain. Skin: Negative.   
Neurological: Negative.   
Endo/Heme/Allergies: Negative.   
Psychiatric/Behavioral: The patient is nervous/anxious.   
  
  
Physical Exam  
Cardiovascular: Normal rate and normal heart sounds.   
Pulmonary/Chest: Breath sounds normal. Right breast exhibits no inverted nipple, no mass, no nipple discharge, no skin change and no tenderness. Left breast exhibits no inverted nipple, no mass, no nipple discharge, no skin change and no tenderness. Breasts are symmetrical.  
 
 
Lymphadenopathy:  
     Right cervical: No superficial cervical, no deep cervical and no posterior cervical adenopathy present. 
     Left cervical: No superficial cervical, no deep cervical and no posterior cervical adenopathy present.  
     Right axillary: No pectoral and no lateral adenopathy present.  
     Left axillary: No pectoral and no lateral adenopathy present. 
  
  
Borderline phyllodes tumor with positive margin for reexcision.

## 2019-02-28 NOTE — ROUTINE PROCESS
Patient: Lisset Monk MRN: [id number]  SSN: [id number] YOB: 1955  Age: 66 y.o. Sex: female Patient is status post Procedure(s): LEFT BREAST RE EXCISION LUMPECTOMY. Surgeon(s) and Role: Milli Danielson MD - Primary Local/Dose/Irrigation:  See Fatmata Monique Peripheral IV 02/28/19 Right Hand (Active) Airway - Endotracheal Tube 02/28/19 Oral (Active) Dressing/Packing:  Wound Breast Left-Dressing Type : Topical skin adhesive/glue (02/28/19 0900) Splint/Cast:  ] Other:

## 2019-02-28 NOTE — BRIEF OP NOTE
BRIEF OPERATIVE NOTE Date of Procedure: 2/28/2019 Preoperative Diagnosis:  border line phyllodes tumor left breast 
Postoperative Diagnosis:  border line phyllodes tumor left breast   
Procedure(s): LEFT BREAST RE EXCISION LUMPECTOMY Surgeon(s) and Role: Karishma Mccabe MD - Primary Surgical Assistant: Marlene Cooper Surgical Staff: 
Circ-1: Nicol Ward RN 
Circ-2: Harleen García RN Registered Nurse Assistant: Elma Pean RN Scrub Tech-1: [facility] Event Time In Time Out Incision Start 1021 Incision Close Anesthesia: General  
Estimated Blood Loss: minimal 
Specimens:  
ID Type Source Tests Collected by Time Destination 1 : left breast superior margin, stitch is inner anterior Fresh Breast  Karishma Mccabe MD 2/28/2019 2523 Pathology Findings: superior margin Complications: none Implants: * No implants in log *

## 2019-02-28 NOTE — OP NOTES
[address]   OPERATIVE REPORT    Name:  Efrain Rueda  MR#:  [id number]  :  1955  ACCOUNT #:  [de-identified]  DATE OF SERVICE:  2019      PREOPERATIVE DIAGNOSIS:  Borderline phyllodes tumors, status post lumpectomy with positive superior margin. POSTOPERATIVE DIAGNOSIS:  Borderline phyllodes tumors, status post lumpectomy with positive superior margin. PROCEDURE PERFORMED:  Re-excision of left lumpectomy superior margin. SURGEON:  Dada Alexander MD    ASSISTANT:  Deepak West. ANESTHESIA:  General.    COMPLICATIONS:  None. SPECIMENS REMOVED:  Superior margin. IMPLANTS:  None. ESTIMATED BLOOD LOSS:  Minimal.    INDICATIONS:  The patient is a 53-year-old female, who had lumpectomy for a borderline phyllodes tumor, which was small but had positive superior margin. She is admitted at this time for re-excision. DESCRIPTION OF PROCEDURE:  After satisfactory induction of general LMA anesthesia, the patient was prepped and draped in a sterile fashion. Previous incision was opened. Seroma cavity was drained and the sutures were taken down. The superior margin was widely re-excised, made hemostatic with Bovie cautery. This was oriented and sent to Pathology. All dissection points were hemostatic. The wound was anesthetized with 0.5% Marcaine and closed with 2 inner layers of 3-0 Vicryl and a running subcuticular Monocryl on the skin. The patient tolerated the procedure well. There were no complications. She was taken to the recovery room in stable condition.       Sherrie Joshi MD [email or web address]  D:  2019 9:17  T:  2019 9:58  JOB #:  [id number]  CC:  Allie Riggs MD

## 2019-02-28 NOTE — DISCHARGE INSTRUCTIONS
Discharge Instructions from Dr. Main [facility]    · I will call you with the pathology results, typically within 1 week from today. · You may shower, but no hot tubs, swimming pools, or baths until your incision is healed. · No heavy lifting with the affected extremity (nothing greater than 5 pounds), and limit its use for the next 4-5 days. · You may use an ice pack for comfort for the next couple of days, but do not place ice directly on the skin. Rather, use a towel or clothing to serve as a barrier between skin and ice to prevent injury. · If I placed a drain, follow the drain instructions provided, especially as you keep a record of the drain output. · Follow medication instructions carefully. · Watch for signs of infection as listed below. · Redness  · Swelling  · Drainage from the incision or from your nipple that appears infected  · Fever over 101 degrees for consecutive readings, or over 99.5 if you are currently undergoing chemotherapy. · Call our office (number is below) for a follow-up appointment. · If you have any problems, our phone number is [phone]. Leda received IV Tylenol at 9:00 this morning, the next time she can have Tylenol is at 3:00 this afternoon      DISCHARGE SUMMARY from Nurse    PATIENT INSTRUCTIONS:    After general anesthesia or intravenous sedation, for 24 hours or while taking prescription Narcotics:  · Limit your activities  · Do not drive and operate hazardous machinery  · Do not make important personal or business decisions  · Do  not drink alcoholic beverages  · If you have not urinated within 8 hours after discharge, please contact your surgeon on call.     Report the following to your surgeon:  · Excessive pain, swelling, redness or odor of or around the surgical area  · Temperature over 100.5  · Nausea and vomiting lasting longer than 4 hours or if unable to take medications  · Any signs of decreased circulation or nerve impairment to extremity: change in color, persistent  numbness, tingling, coldness or increase pain  · Any questions    What to do at Home:    *  Please give a list of your current medications to your Primary Care Provider. *  Please update this list whenever your medications are discontinued, doses are      changed, or new medications (including over-the-counter products) are added. *  Please carry medication information at all times in case of emergency situations. These are general instructions for a healthy lifestyle:    No smoking/ No tobacco products/ Avoid exposure to second hand smoke  Surgeon General's Warning:  Quitting smoking now greatly reduces serious risk to your health. Obesity, smoking, and sedentary lifestyle greatly increases your risk for illness    A healthy diet, regular physical exercise & weight monitoring are important for maintaining a healthy lifestyle    You may be retaining fluid if you have a history of heart failure or if you experience any of the following symptoms:  Weight gain of 3 pounds or more overnight or 5 pounds in a week, increased swelling in our hands or feet or shortness of breath while lying flat in bed. Please call your doctor as soon as you notice any of these symptoms; do not wait until your next office visit. Recognize signs and symptoms of STROKE:    F-face looks uneven    A-arms unable to move or move unevenly    S-speech slurred or non-existent    T-time-call 911 as soon as signs and symptoms begin-DO NOT go       Back to bed or wait to see if you get better-TIME IS BRAIN. Warning Signs of HEART ATTACK     Call 911 if you have these symptoms:   Chest discomfort. Most heart attacks involve discomfort in the center of the chest that lasts more than a few minutes, or that goes away and comes back. It can feel like uncomfortable pressure, squeezing, fullness, or pain.  Discomfort in other areas of the upper body.  Symptoms can include pain or discomfort in one or both arms, the back, neck, jaw, or stomach.  Shortness of breath with or without chest discomfort.  Other signs may include breaking out in a cold sweat, nausea, or lightheadedness. Don't wait more than five minutes to call 911 - MINUTES MATTER! Fast action can save your life. Calling 911 is almost always the fastest way to get lifesaving treatment. Emergency Medical Services staff can begin treatment when they arrive -- up to an hour sooner than if someone gets to the hospital by car. The discharge information has been reviewed with the patient. The patient verbalized understanding. Discharge medications reviewed with the patient and appropriate educational materials and side effects teaching were provided.   ___________________________________________________________________________________________________________________________________

## 2019-02-28 NOTE — ANESTHESIA PREPROCEDURE EVALUATION
Anesthetic History No history of anesthetic complications Review of Systems / Medical History Patient summary reviewed, nursing notes reviewed and pertinent labs reviewed Pulmonary Within defined limits Neuro/Psych Within defined limits Cardiovascular Hypertension: well controlled GI/Hepatic/Renal 
  
GERD: well controlled Endo/Other Hypothyroidism: well controlled Morbid obesity and arthritis Other Findings Physical Exam 
 
Airway Mallampati: II 
TM Distance: > 6 cm Neck ROM: normal range of motion Mouth opening: Normal 
 
 Cardiovascular Regular rate and rhythm,  S1 and S2 normal,  no murmur, click, rub, or gallop Dental 
No notable dental hx Pulmonary Breath sounds clear to auscultation Abdominal 
GI exam deferred Other Findings Anesthetic Plan ASA: 3 Anesthesia type: general 
 
 
 
 
Induction: Intravenous Anesthetic plan and risks discussed with: Patient

## 2019-02-28 NOTE — ANESTHESIA POSTPROCEDURE EVALUATION
Post-Anesthesia Evaluation and Assessment Patient: Elie Portillo MRN: [id number]  SSN: [id number] YOB: 1955  Age: 66 y.o. Sex: female I have evaluated the patient and they are stable and ready for discharge from the PACU. Cardiovascular Function/Vital Signs Visit Vitals /44 (BP 1 Location: Left arm, BP Patient Position: At rest) Pulse 89 Temp 36.8 °C (98.2 °F) Resp 19 Ht 5' 1\" (1.549 m) Wt 85.3 kg (188 lb) SpO2 94% BMI 35.52 kg/m² Patient is status post General anesthesia for Procedure(s): LEFT BREAST RE EXCISION LUMPECTOMY. Nausea/Vomiting: None Postoperative hydration reviewed and adequate. Pain: 
Pain Scale 1: Numeric (0 - 10) (02/28/19 0932) Pain Intensity 1: 0 (02/28/19 0932) Managed Neurological Status:  
Neuro (WDL): Within Defined Limits (02/28/19 0932) Neuro Neurologic State: Drowsy (02/28/19 0932) LUE Motor Response: Purposeful (02/28/19 0932) LLE Motor Response: Purposeful (02/28/19 0932) RUE Motor Response: Purposeful (02/28/19 0932) RLE Motor Response: Purposeful (02/28/19 0932) At baseline Mental Status, Level of Consciousness: Alert and  oriented to person, place, and time Pulmonary Status:  
O2 Device: Nasal cannula (02/28/19 0932) Adequate oxygenation and airway patent Complications related to anesthesia: None Post-anesthesia assessment completed. No concerns Signed By: José Eid MD   
 February 28, 2019 Procedure(s): LEFT BREAST RE EXCISION LUMPECTOMY. 
 
<BSHSIANPOST> Visit Vitals /44 (BP 1 Location: Left arm, BP Patient Position: At rest) Pulse 89 Temp 36.8 °C (98.2 °F) Resp 19 Ht 5' 1\" (1.549 m) Wt 85.3 kg (188 lb) SpO2 94% BMI 35.52 kg/m²

## 2019-03-01 ENCOUNTER — PATIENT OUTREACH (OUTPATIENT)
Dept: OTHER | Age: 64
End: 2019-03-01

## 2019-03-01 ENCOUNTER — TELEPHONE (OUTPATIENT)
Dept: SURGERY | Age: 64
End: 2019-03-01

## 2019-03-01 NOTE — PROGRESS NOTES
Covering for Bakari Jacobs RN. Raul Chacon Telephonic outreach to the patient following patient's scheduled surgery. Left a discreet VM with a request for a return call. Will forward telephonic outreach attempt to Bakari Jacobs, for continued follow up.

## 2019-03-04 ENCOUNTER — PATIENT OUTREACH (OUTPATIENT)
Dept: OTHER | Age: 64
End: 2019-03-04

## 2019-03-04 NOTE — PROGRESS NOTES
Covering for Baljeet Orozco RN. Patient returned this care manager's call, leaving a VM for this care manager. Telephonic outreach to follow up with patient following her lumpectomy on 2/28. Patient states that she is currently pain free, was taking prescribed Norco immediately following surgery, however not requiring pain medication at this time. Incision looks healthy, skin is glued, not drainage, tenderness, or swelling noted. Reviewed red flags and discharge instructions. Patient will call to schedule and appointment for follow up in 6 weeks. Patient stated that she was feeling better, maybe did a little too much on Sunday, so resting today. Anticipates returning to work on 3/5 or 3/6. Encouraged the patient to rest and allow healing to take place. Patient has no questions or concerns at this time.

## 2019-03-05 ENCOUNTER — PATIENT OUTREACH (OUTPATIENT)
Dept: OTHER | Age: 64
End: 2019-03-05

## 2019-03-05 ENCOUNTER — TELEPHONE (OUTPATIENT)
Dept: SURGERY | Age: 64
End: 2019-03-05

## 2019-03-05 NOTE — LETTER
NOTIFICATION OF RETURN TO WORK  
 
3/5/2019 5:40 PM 
 
Ms. Jerson [address] [address] Sherif Morales To Whom It May Concern: 
 
Jerson Weber is under the care of [address] Sherif Lobomaxim She had surgery last week. She can return to work tomorrow without restrictions. If there are questions or concerns, please have the patient contact our office.  
 
Sincerely, 
 
 
 
 
 
Santi Clemente RN, BSN for  
Kandace Fowler MD

## 2019-03-05 NOTE — TELEPHONE ENCOUNTER
Returned patient's call. She needs a note to RTW tomorrow without restrictions. I am going to e-mail this to her at [email or web address]. She does not do any lifting at work, but the note needs to say that she can go back without restrictions. She was appreciative of the return phone call and letter.

## 2019-03-05 NOTE — PROGRESS NOTES
Care Manager contacted the patient in continued follow up of CM services. No response and left voicemail message with my contact information, asking for return call. Will await response to FU call.

## 2019-03-13 ENCOUNTER — PATIENT OUTREACH (OUTPATIENT)
Dept: OTHER | Age: 64
End: 2019-03-13

## 2019-03-13 NOTE — PROGRESS NOTES
Care Manager contacted the patient by telephone in follow up. Verified  and zip code with patient as identifiers. States one month postop initial excision lumpectomy and two weeks postop additional excision of same site; Second excision pathology results shows without presence of phyllodes tumor;  States left breast incisional pain has resolved, occasional Tylenol prn;   Denies signs of infection, incisions well healed at site second exicision;     Goals Met   Verbalize pain control and return to prior activity level;  Demonstrates no return to ED or red flags within 30 days; Resolving current episode of case management. Patient has met patient stated and/or medical goals. Patient consistenly demonstrates understanding of the medical action plan through execution of plan. Appointments with key providers are scheduled and attended. Plan of care is modified and updated to address new challenges and barriers with minimal support from the CM team(proactively uses physicians and community resources) The support system remains current and has been modified as needed. Patient continues to acquire needed resources from the current support system established. Teach back demonstrates that patient understands education for self management of chronic conditions. Patient consistenly communicates understanding of signs,symptoms and complications for all major diagnoses. Patient modifies his/her lifestyle toreduce or avoid risk factors to his/her health. ECM will follow as needed and patient has ECM contact information for future needs.

## 2019-03-20 ENCOUNTER — OFFICE VISIT (OUTPATIENT)
Dept: SURGERY | Age: 64
End: 2019-03-20

## 2019-03-20 VITALS
WEIGHT: 183.4 LBS | SYSTOLIC BLOOD PRESSURE: 173 MMHG | BODY MASS INDEX: 34.63 KG/M2 | DIASTOLIC BLOOD PRESSURE: 88 MMHG | HEIGHT: 61 IN | HEART RATE: 119 BPM

## 2019-03-20 DIAGNOSIS — D48.62 PHYLLODES TUMOR, LEFT: Primary | ICD-10-CM

## 2019-03-20 NOTE — PROGRESS NOTES
HISTORY OF PRESENT ILLNESS Ginna La is a 66 y.o. female. HPI ESTABLISHED patient here for post-op visit, 3 weeks s/p LEFT breast re-excision lumpectomy. She states she is still slightly tender at times but that pain is gradually decreasing. She feels she has healed well and has returned to work. 
 
03/27/14: Breast, left, 11:00, subareolar, excision: Borderline phyllodes tumor. 01/23/19: Left breast, retroareolar mass, core biopsy: Low grade fibroepithelial lesion. The differential diagnosis includes fibroadenoma as well as low-grade phyllodes tumor. 02/12/19: Breast, left, lumpectomy: Borderline Phyllodes tumor, at least 8 mm, lymphovascular invasion not identified. Borderline phyllodes. Last screening and diagnostic imaging, including biopsy, January, 2019 ROS Physical Exam 
 
ASSESSMENT and PLAN 
{ASSESSMENT/PLAN:[id number]}

## 2019-03-20 NOTE — PROGRESS NOTES
HISTORY OF PRESENT ILLNESS  Harleen Shanks is a 66 y.o. female. HPI  ESTABLISHED patient here for post-op visit, 3 weeks s/p LEFT breast re-excision lumpectomy. She states she is still slightly tender at times but that pain is gradually decreasing. She feels she has healed well and has returned to work.     03/27/14: Breast, left, 11:00, subareolar, excision: Borderline phyllodes tumor.     01/23/19: Left breast, retroareolar mass, core biopsy: Low grade fibroepithelial lesion. The differential diagnosis includes fibroadenoma as well as low-grade phyllodes tumor.     02/12/19: Breast, left, lumpectomy: Borderline Phyllodes tumor, at least 8 mm, lymphovascular invasion not identified. Borderline phyllodes. 02/28/19: Left breast, superior margin, re-excision with examination of margins: Previous lumpectomy site changes with chronic inflammation and fat necrosis.  Negative for residual phyllodes tumor.     Last screening and diagnostic imaging, including biopsy, January, 2019    Past Medical History:   Diagnosis Date    Adrenal cancer ([facility] [address] 1996    Adverse effect of anesthesia 2014    \"stopped breathing during MAC, diagnosed with sleep apnea at that time\"    Anxiety and depression     Arthritis     pt not aware of this diagnosis    Asthma 2000s    mostly resolved    Bronchitis     yearly    Colon polyps     Diverticulitis     Diverticulosis     GERD (gastroesophageal reflux disease)     Hypertension     Hypothyroid     Ill-defined condition     \"trouble focusing\"    Ill-defined condition     cyst, back of head    Kidney stones 1980s    Lipoma     front of right shoulder, right AC    Nausea & vomiting     Palpitations     Retinal tear left    partial, has floaters in vision    Shingles 12/2015    Skin cancer 2004    melanoma- L flank    Skin cancer 2006    melanoma- L frontal scalp    Sleep apnea     uses CPAP    Sun-damaged skin     Tanning bed exposure     Vitiligo     \"melanoma induced vitiligo\"       Past Surgical History:   Procedure Laterality Date    HX BREAST BIOPSY  3/27/2014    EXCISION OF LEFT BREAST NODULE performed by Liliana Nolan MD at [address] HX BREAST LUMPECTOMY Left 2/12/2019    LEFT BREAST LUMPECTOMY WITH ULTRASOUND performed by August Bryan MD at [address] HX BREAST LUMPECTOMY Left 2/28/2019    LEFT BREAST RE EXCISION LUMPECTOMY performed by August Bryan MD at [facility]    HX CHOLECYSTECTOMY  1996    HX COLONOSCOPY      HX GYN  2013    uterine ablation    HX HEENT Left 2019    laser surgery for retinal tear    HX OTHER SURGICAL      2 melanoma surgeries, left flank and left head    HX OTHER SURGICAL  1997    adrenalectomy    HX WISDOM TEETH EXTRACTION  2000       Social History     Socioeconomic History    Marital status:      Spouse name: Not on file    Number of children: Not on file    Years of education: Not on file    Highest education level: Not on file   Occupational History    Not on file   Social Needs    Financial resource strain: Not on file    Food insecurity:     Worry: Not on file     Inability: Not on file    Transportation needs:     Medical: Not on file     Non-medical: Not on file   Tobacco Use    Smoking status: Never Smoker    Smokeless tobacco: Never Used   Substance and Sexual Activity    Alcohol use: Yes     Comment: 1 drink/week (may be wine, beer, or liquor)    Drug use: No    Sexual activity: Not on file   Lifestyle    Physical activity:     Days per week: Not on file     Minutes per session: Not on file    Stress: Not on file   Relationships    Social connections:     Talks on phone: Not on file     Gets together: Not on file     Attends Baptism service: Not on file     Active member of club or organization: Not on file     Attends meetings of clubs or organizations: Not on file     Relationship status: Not on file    Intimate partner violence:     Fear of current or ex partner: Not on file     Emotionally abused: Not on file     Physically abused: Not on file     Forced sexual activity: Not on file   Other Topics Concern    Not on file   Social History Narrative    Not on file       Current Outpatient Medications on File Prior to Visit   Medication Sig Dispense Refill    dextroamphetamine-amphetamine (ADDERALL) 20 mg tablet Take 20 mg by mouth two (2) times a day.  melatonin 10 mg tab Take 10 mg by mouth nightly as needed.  ALPRAZolam (XANAX) 1 mg tablet Take 1 mg by mouth nightly.  ibuprofen (ADVIL) 200 mg tablet Take 600 mg by mouth every six (6) hours as needed for Pain.  ascorbate calcium (VITAMIN C PO) Take 1 Tab by mouth daily as needed.  ZINC PO Take 1 Tab by mouth daily as needed.  ascorbic acid/multivit-min (EMERGEN-C PO) Take 1 Package by mouth daily as needed.  LORazepam (ATIVAN) 1 mg tablet TAKE 1 TABLET BY MOUTH TWICE A DAY AS NEEDED FOR ANXIETY  5    hydrochlorothiazide (HYDRODIURIL) 25 mg tablet Take 25 mg by mouth every morning. 99    esomeprazole (NEXIUM) 20 mg capsule Take 20 mg by mouth every morning.  levothyroxine (SYNTHROID) 150 mcg tablet Take 150 mcg by mouth Daily (before breakfast).  potassium chloride SR (KLOR-CON 10) 10 mEq tablet Take 10 mEq by mouth daily.  escitalopram (LEXAPRO) 20 mg tablet Take 20 mg by mouth every morning.  cholecalciferol, vitamin D3, (VITAMIN D3) 2,000 unit Tab Take 2,000 Units by mouth daily.  CALCIUM PO Take 1 Tab by mouth daily as needed. No current facility-administered medications on file prior to visit. No Known Allergies    OB History        0    Para        Term                AB        Living           SAB        TAB        Ectopic        Molar        Multiple        Live Births              Obstetric Comments   Menarche:  17. LMP: age 56. # of Children:  0. Age at Delivery of First Child:  n/a.    Hysterectomy/oophorectomy: NO/NO.  Breast Bx:  yes. Hx of Breast Feeding:  no. BCP:  no. Hormone therapy:  no.           ROS    Physical Exam   Cardiovascular: Normal rate and normal heart sounds. Pulmonary/Chest: Breath sounds normal. Right breast exhibits no inverted nipple, no mass, no nipple discharge, no skin change and no tenderness. Left breast exhibits no inverted nipple, no mass, no nipple discharge, no skin change and no tenderness. Breasts are symmetrical.       Lymphadenopathy:        Right cervical: No superficial cervical, no deep cervical and no posterior cervical adenopathy present. Left cervical: No superficial cervical, no deep cervical and no posterior cervical adenopathy present. Right axillary: No pectoral and no lateral adenopathy present. Left axillary: No pectoral and no lateral adenopathy present. ASSESSMENT and PLAN    ICD-10-CM ICD-9-CM    1. 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 Well healed incision s/p lumpectomy and re-excision, no evidence of local recurrence. No consensus on need for XRT. As tumor was small, do not recommend XRT. Will continue surveillance with US in 6 months. F/U in 6 months. This plan was reviewed with the patient and patient agrees. All questions were answered.     Written by Lakshmi Lazo, as dictated by Dr. Kandace Fowler MD.

## 2019-03-20 NOTE — COMMUNICATION BODY
HISTORY OF PRESENT ILLNESS  Werner Jaffe is a 66 y.o. female. HPI  ESTABLISHED patient here for post-op visit, 3 weeks s/p LEFT breast re-excision lumpectomy. She states she is still slightly tender at times but that pain is gradually decreasing. She feels she has healed well and has returned to work.     03/27/14: Breast, left, 11:00, subareolar, excision: Borderline phyllodes tumor.     01/23/19: Left breast, retroareolar mass, core biopsy: Low grade fibroepithelial lesion. The differential diagnosis includes fibroadenoma as well as low-grade phyllodes tumor.     02/12/19: Breast, left, lumpectomy: Borderline Phyllodes tumor, at least 8 mm, lymphovascular invasion not identified. Borderline phyllodes. 02/28/19: Left breast, superior margin, re-excision with examination of margins: Previous lumpectomy site changes with chronic inflammation and fat necrosis.  Negative for residual phyllodes tumor.     Last screening and diagnostic imaging, including biopsy, January, 2019    Past Medical History:   Diagnosis Date    Adrenal cancer ([facility] [address] 1996    Adverse effect of anesthesia 2014    \"stopped breathing during MAC, diagnosed with sleep apnea at that time\"    Anxiety and depression     Arthritis     pt not aware of this diagnosis    Asthma 2000s    mostly resolved    Bronchitis     yearly    Colon polyps     Diverticulitis     Diverticulosis     GERD (gastroesophageal reflux disease)     Hypertension     Hypothyroid     Ill-defined condition     \"trouble focusing\"    Ill-defined condition     cyst, back of head    Kidney stones 1980s    Lipoma     front of right shoulder, right AC    Nausea & vomiting     Palpitations     Retinal tear left    partial, has floaters in vision    Shingles 12/2015    Skin cancer 2004    melanoma- L flank    Skin cancer 2006    melanoma- L frontal scalp    Sleep apnea     uses CPAP    Sun-damaged skin     Tanning bed exposure     Vitiligo     \"melanoma induced vitiligo\"       Past Surgical History:   Procedure Laterality Date    HX BREAST BIOPSY  3/27/2014    EXCISION OF LEFT BREAST NODULE performed by Earl Santoro MD at [address] HX BREAST LUMPECTOMY Left 2/12/2019    LEFT BREAST LUMPECTOMY WITH ULTRASOUND performed by Kojo Allen MD at [address] HX BREAST LUMPECTOMY Left 2/28/2019    LEFT BREAST RE EXCISION LUMPECTOMY performed by Kojo [facility] MD at [facility] AM[facility]ULATORY OR    HX CHOLECYSTECTOMY  1996    HX COLONOSCOPY      HX GYN  2013    uterine ablation    HX HEENT Left 2019    laser surgery for retinal tear    HX OTHER SURGICAL      2 melanoma surgeries, left flank and left head    HX OTHER SURGICAL  1997    adrenalectomy    HX WISDOM TEETH EXTRACTION  2000       Social History     Socioeconomic History    Marital status:      Spouse name: Not on file    Number of children: Not on file    Years of education: Not on file    Highest education level: Not on file   Occupational History    Not on file   Social Needs    Financial resource strain: Not on file    Food insecurity:     Worry: Not on file     Inability: Not on file    Transportation needs:     Medical: Not on file     Non-medical: Not on file   Tobacco Use    Smoking status: Never Smoker    Smokeless tobacco: Never Used   Substance and Sexual Activity    Alcohol use: Yes     Comment: 1 drink/week (may be wine, beer, or liquor)    Drug use: No    Sexual activity: Not on file   Lifestyle    Physical activity:     Days per week: Not on file     Minutes per session: Not on file    Stress: Not on file   Relationships    Social connections:     Talks on phone: Not on file     Gets together: Not on file     Attends Mor[employer] service: Not on file     Active member of club or organization: Not on file     Attends meetings of clubs or organizations: Not on file     Relationship status: Not on file    Intimate partner violence:     Fear of current or ex partner: Not on file     Emotionally abused: Not on file     Physically abused: Not on file     Forced sexual activity: Not on file   Other Topics Concern    Not on file   Social History Narrative    Not on file       Current Outpatient Medications on File Prior to Visit   Medication Sig Dispense Refill    dextroamphetamine-amphetamine (ADDERALL) 20 mg tablet Take 20 mg by mouth two (2) times a day.  melatonin 10 mg tab Take 10 mg by mouth nightly as needed.  ALPRAZolam (XANAX) 1 mg tablet Take 1 mg by mouth nightly.  ibuprofen (ADVIL) 200 mg tablet Take 600 mg by mouth every six (6) hours as needed for Pain.  ascorbate calcium (VITAMIN C PO) Take 1 Tab by mouth daily as needed.  ZINC PO Take 1 Tab by mouth daily as needed.  ascorbic acid/multivit-min (EMERGEN-C PO) Take 1 Package by mouth daily as needed.  LORazepam (ATIVAN) 1 mg tablet TAKE 1 TABLET BY MOUTH TWICE A DAY AS NEEDED FOR ANXIETY  5    hydrochlorothiazide (HYDRODIURIL) 25 mg tablet Take 25 mg by mouth every morning. 99    esomeprazole (NEXIUM) 20 mg capsule Take 20 mg by mouth every morning.  levothyroxine (SYNTHROID) 150 mcg tablet Take 150 mcg by mouth Daily (before breakfast).  potassium chloride SR (KLOR-CON 10) 10 mEq tablet Take 10 mEq by mouth daily.  escitalopram (LEXAPRO) 20 mg tablet Take 20 mg by mouth every morning.  cholecalciferol, vitamin D3, (VITAMIN D3) 2,000 unit Tab Take 2,000 Units by mouth daily.  CALCIUM PO Take 1 Tab by mouth daily as needed. No current facility-administered medications on file prior to visit. No Known Allergies    OB History        0    Para        Term                AB        Living           SAB        TAB        Ectopic        Molar        Multiple        Live Births              Obstetric Comments   Menarche:  17. LMP: age 56. # of Children:  0. Age at Delivery of First Child:  n/a.    Hysterectomy/oophorectomy: NO/NO.  Breast Bx:  yes. Hx of Breast Feeding:  no. BCP:  no. Hormone therapy:  no.           ROS    Physical Exam   Cardiovascular: Normal rate and normal heart sounds. Pulmonary/Chest: Breath sounds normal. Right breast exhibits no inverted nipple, no mass, no nipple discharge, no skin change and no tenderness. Left breast exhibits no inverted nipple, no mass, no nipple discharge, no skin change and no tenderness. Breasts are symmetrical.       Lymphadenopathy:        Right cervical: No superficial cervical, no deep cervical and no posterior cervical adenopathy present. Left cervical: No superficial cervical, no deep cervical and no posterior cervical adenopathy present. Right axillary: No pectoral and no lateral adenopathy present. Left axillary: No pectoral and no lateral adenopathy present. ASSESSMENT and PLAN    ICD-10-CM ICD-9-CM    1. 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 Well healed incision s/p lumpectomy and re-excision, no evidence of local recurrence. No consensus on need for XRT. As tumor was small, do not recommend XRT. Will continue surveillance with US in 6 months. F/U in 6 months. This plan was reviewed with the patient and patient agrees. All questions were answered.     Written by Mark Sanders, as dictated by Dr. Federico Ruth MD.

## 2019-03-20 NOTE — PATIENT INSTRUCTIONS

## 2019-06-21 ENCOUNTER — OFFICE VISIT (OUTPATIENT)
Dept: URGENT CARE | Age: 64
End: 2019-06-21

## 2019-06-21 VITALS
DIASTOLIC BLOOD PRESSURE: 67 MMHG | SYSTOLIC BLOOD PRESSURE: 132 MMHG | TEMPERATURE: 98.1 F | HEIGHT: 61 IN | HEART RATE: 83 BPM | OXYGEN SATURATION: 97 % | RESPIRATION RATE: 16 BRPM | BODY MASS INDEX: 35.87 KG/M2 | WEIGHT: 190 LBS

## 2019-06-21 DIAGNOSIS — J01.90 ACUTE SINUSITIS, RECURRENCE NOT SPECIFIED, UNSPECIFIED LOCATION: Primary | ICD-10-CM

## 2019-06-21 DIAGNOSIS — J40 BRONCHITIS: ICD-10-CM

## 2019-06-21 RX ORDER — ALBUTEROL SULFATE 90 UG/1
2 AEROSOL, METERED RESPIRATORY (INHALATION)
Qty: 1 INHALER | Refills: 0 | Status: SHIPPED | OUTPATIENT
Start: 2019-06-21 | End: 2019-09-25 | Stop reason: ALTCHOICE

## 2019-06-21 RX ORDER — PREDNISONE 20 MG/1
40 TABLET ORAL DAILY
Qty: 10 TAB | Refills: 0 | Status: SHIPPED | OUTPATIENT
Start: 2019-06-21 | End: 2019-06-26

## 2019-06-21 RX ORDER — DOXYCYCLINE HYCLATE 100 MG
100 TABLET ORAL 2 TIMES DAILY
Qty: 14 TAB | Refills: 0 | Status: SHIPPED | OUTPATIENT
Start: 2019-06-21 | End: 2019-06-28

## 2019-06-21 NOTE — PATIENT INSTRUCTIONS
Sinusitis: Care Instructions  Your Care Instructions    Sinusitis is an infection of the lining of the sinus cavities in your head. Sinusitis often follows a cold. It causes pain and pressure in your head and face. In most cases, sinusitis gets better on its own in 1 to 2 weeks. But some mild symptoms may last for several weeks. Sometimes antibiotics are needed. Follow-up care is a key part of your treatment and safety. Be sure to make and go to all appointments, and call your doctor if you are having problems. It's also a good idea to know your test results and keep a list of the medicines you take. How can you care for yourself at home? · Take an over-the-counter pain medicine, such as acetaminophen (Tylenol), ibuprofen (Advil, Motrin), or naproxen (Aleve). Read and follow all instructions on the label. · If the doctor prescribed antibiotics, take them as directed. Do not stop taking them just because you feel better. You need to take the full course of antibiotics. · Be careful when taking over-the-counter cold or flu medicines and Tylenol at the same time. Many of these medicines have acetaminophen, which is Tylenol. Read the labels to make sure that you are not taking more than the recommended dose. Too much acetaminophen (Tylenol) can be harmful. · Breathe warm, moist air from a steamy shower, a hot bath, or a sink filled with hot water. Avoid cold, dry air. Using a humidifier in your home may help. Follow the directions for cleaning the machine. · Use saline (saltwater) nasal washes to help keep your nasal passages open and wash out mucus and bacteria. You can buy saline nose drops at a grocery store or drugstore. Or you can make your own at home by adding 1 teaspoon of salt and 1 teaspoon of baking soda to 2 cups of distilled water. If you make your own, fill a bulb syringe with the solution, insert the tip into your nostril, and squeeze gently. [employer] your nose.   · Put a hot, wet towel or a warm gel pack on your face 3 or 4 times a day for 5 to 10 minutes each time. · Try a decongestant nasal spray like oxymetazoline (Afrin). Do not use it for more than 3 days in a row. Using it for more than 3 days can make your congestion worse. When should you call for help? Call your doctor now or seek immediate medical care if:    · You have new or worse swelling or redness in your face or around your eyes.     · You have a new or higher fever.    Watch closely for changes in your health, and be sure to contact your doctor if:    · You have new or worse facial pain.     · The mucus from your nose becomes thicker (like pus) or has new blood in it.     · You are not getting better as expected. Where can you learn more? Go to http://comfort-trevin.info/. Enter D881 in the search box to learn more about \"Sinusitis: Care Instructions. \"  Current as of: March 27, 2018  Content Version: 11.9  © 9543-9722 [employer]. Care instructions adapted under license by [employer] (which disclaims liability or warranty for this information). If you have questions about a medical condition or this instruction, always ask your healthcare professional. [place] [address] any warranty or liability for your use of this information. Bronchitis: Care Instructions  Your Care Instructions    Bronchitis is inflammation of the bronchial tubes, which carry air to the lungs. The tubes swell and produce mucus, or phlegm. The mucus and inflamed bronchial tubes make you cough. You may have trouble breathing. Most cases of bronchitis are caused by viruses like those that cause colds. Antibiotics usually do not help and they may be harmful. Bronchitis usually develops rapidly and lasts about 2 to 3 weeks in otherwise healthy people. Follow-up care is a key part of your treatment and safety.  Be sure to make and go to all appointments, and call your doctor if you are having problems. It's also a good idea to know your test results and keep a list of the medicines you take. How can you care for yourself at home? · Take all medicines exactly as prescribed. Call your doctor if you think you are having a problem with your medicine. · Get some extra rest.  · Take an over-the-counter pain medicine, such as acetaminophen (Tylenol), ibuprofen (Advil, Motrin), or naproxen (Aleve) to reduce fever and relieve body aches. Read and follow all instructions on the label. · Do not take two or more pain medicines at the same time unless the doctor told you to. Many pain medicines have acetaminophen, which is Tylenol. Too much acetaminophen (Tylenol) can be harmful. · Take an over-the-counter cough medicine that contains dextromethorphan to help quiet a dry, hacking cough so that you can sleep. Avoid cough medicines that have more than one active ingredient. Read and follow all instructions on the label. · Breathe moist air from a humidifier, hot shower, or sink filled with hot water. The heat and moisture will thin mucus so you can cough it out. · Do not smoke. Smoking can make bronchitis worse. If you need help quitting, talk to your doctor about stop-smoking programs and medicines. These can increase your chances of quitting for good. When should you call for help? Call 911 anytime you think you may need emergency care. For example, call if:    · You have severe trouble breathing.    Call your doctor now or seek immediate medical care if:    · You have new or worse trouble breathing.     · You cough up dark brown or bloody mucus (sputum).     · You have a new or higher fever.     · You have a new rash.    Watch closely for changes in your health, and be sure to contact your doctor if:    · You cough more deeply or more often, especially if you notice more mucus or a change in the color of your mucus.     · You are not getting better as expected. Where can you learn more?   Go to http://comfort-trevin.info/. Enter H333 in the search box to learn more about \"Bronchitis: Care Instructions. \"  Current as of: September 5, 2018  Content Version: 11.9  © 5933-9348 [employer]. Care instructions adapted under license by [employer] (which disclaims liability or warranty for this information). If you have questions about a medical condition or this instruction, always ask your healthcare professional. [place] [address] any warranty or liability for your use of this information.

## 2019-06-21 NOTE — PROGRESS NOTES
The history is provided by the patient. Cold Symptoms   The history is provided by the patient. This is a new problem. Episode onset: >2 weeks ago. The problem occurs constantly. The problem has not changed since onset. The cough is productive of sputum. There has been no fever. Associated symptoms include rhinorrhea, sore throat, shortness of breath and wheezing. Pertinent negatives include no chest pain, no chills, no sweats, no eye redness, no ear congestion, no ear pain, no headaches, no myalgias, no nausea, no vomiting and no confusion. She has tried cough syrup and decongestants for the symptoms. The treatment provided no relief. Risk factors: travel to [employer] She is not a smoker.         Past Medical History:   Diagnosis Date    Adrenal cancer ([address] 1996    Adverse effect of anesthesia 2014    \"stopped breathing during MAC, diagnosed with sleep apnea at that time\"    Anxiety and depression     Arthritis     pt not aware of this diagnosis    Asthma 2000s    mostly resolved    Bronchitis     yearly    Colon polyps     Diverticulitis     Diverticulosis     GERD (gastroesophageal reflux disease)     Hypertension     Hypothyroid     Ill-defined condition     \"trouble focusing\"    Ill-defined condition     cyst, back of head    Kidney stones 1980s    Lipoma     front of right shoulder, right AC    Nausea & vomiting     Palpitations     Retinal tear left    partial, has floaters in vision    Shingles 12/2015    Skin cancer 2004    melanoma- L flank    Skin cancer 2006    melanoma- L frontal scalp    Sleep apnea     uses CPAP    Sun-damaged skin     Tanning bed exposure     Vitiligo     \"melanoma induced vitiligo\"        Past Surgical History:   Procedure Laterality Date    HX BREAST BIOPSY  3/27/2014    EXCISION OF LEFT BREAST NODULE performed by Marnie Mesa MD at [address] [place] [address] HX BREAST LUMPECTOMY Left 2/12/2019    LEFT BREAST LUMPECTOMY WITH ULTRASOUND performed by Kenya Briceño Matteo Ying MD at [facility] OR    HX BREAST LUMPECTOMY Left 2/28/2019    LEFT BREAST RE EXCISION LUMPECTOMY performed by Esthela Lund MD at [facility]    HX CHOLECYSTECTOMY  1996    HX COLONOSCOPY      HX GYN  2013    uterine ablation    HX HEENT Left 2019    laser surgery for retinal tear    HX OTHER SURGICAL      2 melanoma surgeries, left flank and left head    HX OTHER SURGICAL  1997    adrenalectomy    HX WISDOM TEETH EXTRACTION  2000         Family History   Problem Relation Age of Onset    Breast Cancer Maternal Grandmother         66's        Social History     Socioeconomic History    Marital status:      Spouse name: Not on file    Number of children: Not on file    Years of education: Not on file    Highest education level: Not on file   Occupational History    Not on file   Social Needs    Financial resource strain: Not on file    Food insecurity:     Worry: Not on file     Inability: Not on file    Transportation needs:     Medical: Not on file     Non-medical: Not on file   Tobacco Use    Smoking status: Never Smoker    Smokeless tobacco: Never Used   Substance and Sexual Activity    Alcohol use: Yes     Comment: 1 drink/week (may be wine, beer, or liquor)    Drug use: No    Sexual activity: Not on file   Lifestyle    Physical activity:     Days per week: Not on file     Minutes per session: Not on file    Stress: Not on file   Relationships    Social connections:     Talks on phone: Not on file     Gets together: Not on file     Attends [employer] service: Not on file     Active member of club or organization: Not on file     Attends meetings of clubs or organizations: Not on file     Relationship status: Not on file    Intimate partner violence:     Fear of current or ex partner: Not on file     Emotionally abused: Not on file     Physically abused: Not on file     Forced sexual activity: Not on file   Other Topics Concern    Not on file Social History Narrative    Not on file                ALLERGIES: Patient has no known allergies. Review of Systems   Constitutional: Negative for chills and fever. HENT: Positive for congestion, rhinorrhea, sinus pressure, sinus pain and sore throat. Negative for ear pain, trouble swallowing and voice change. Eyes: Negative for redness. Respiratory: Positive for cough, chest tightness, shortness of breath and wheezing. Cardiovascular: Negative for chest pain, palpitations and leg swelling. Gastrointestinal: Negative for abdominal pain, diarrhea, nausea and vomiting. Musculoskeletal: Negative for myalgias. Skin: Negative for rash. Neurological: Negative for headaches. Psychiatric/Behavioral: Negative for confusion. Vitals:    06/21/19 1654   BP: 132/67   Pulse: 83   Resp: 16   Temp: 98.1 °F (36.7 °C)   SpO2: 97%   Weight: 190 lb (86.2 kg)   Height: 5' 1\" (1.549 m)       Physical Exam   Constitutional: She is oriented to person, place, and time. She appears well-developed and well-nourished. No distress. HENT:   Nose: Right sinus exhibits maxillary sinus tenderness and frontal sinus tenderness. Left sinus exhibits maxillary sinus tenderness and frontal sinus tenderness. Mouth/Throat: Oropharynx is clear and moist. No oropharyngeal exudate. Eyes: Pupils are equal, round, and reactive to light. Conjunctivae and EOM are normal. Right eye exhibits no discharge. Left eye exhibits no discharge. No scleral icterus. Neck: Normal range of motion. Neck supple. No JVD present. Cardiovascular: Normal rate, regular rhythm, normal heart sounds and intact distal pulses. No murmur heard. Pulmonary/Chest: Effort normal and breath sounds normal. No stridor. No respiratory distress. She has no wheezes. She has no rales. Abdominal: Soft. Bowel sounds are normal. She exhibits no distension. There is no tenderness. There is no rebound and no guarding.    Musculoskeletal: She exhibits no edema or tenderness. Neurological: She is alert and oriented to person, place, and time. Skin: Skin is warm and dry. No rash noted. She is not diaphoretic. Psychiatric: She has a normal mood and affect. Nursing note and vitals reviewed. MDM     Differential Diagnosis; Clinical Impression; Plan:     Sinusitis/Bronchitis x 2 weeks without improvement.   - doxycycline  - prednisone  - albuterol MDI  - rest, fluids, supportive care      Procedures

## 2019-06-26 ENCOUNTER — HOSPITAL ENCOUNTER (OUTPATIENT)
Dept: PREADMISSION TESTING | Age: 64
Discharge: HOME | End: 2019-06-26
Payer: COMMERCIAL

## 2019-06-26 VITALS
TEMPERATURE: 98.3 F | HEIGHT: 61 IN | WEIGHT: 185 LBS | SYSTOLIC BLOOD PRESSURE: 161 MMHG | HEART RATE: 99 BPM | BODY MASS INDEX: 34.93 KG/M2 | DIASTOLIC BLOOD PRESSURE: 96 MMHG

## 2019-06-26 LAB
ANION GAP SERPL CALC-SCNC: 8 MMOL/L (ref 5–15)
BASOPHILS # BLD: 0 K/UL (ref 0–0.1)
BASOPHILS NFR BLD: 0 % (ref 0–1)
BUN SERPL-MCNC: 24 MG/DL (ref 6–20)
BUN/CREAT SERPL: 25 (ref 12–20)
CALCIUM SERPL-MCNC: 9.2 MG/DL (ref 8.5–10.1)
CHLORIDE SERPL-SCNC: 108 MMOL/L (ref 97–108)
CO2 SERPL-SCNC: 26 MMOL/L (ref 21–32)
CREAT SERPL-MCNC: 0.97 MG/DL (ref 0.55–1.02)
DIFFERENTIAL METHOD BLD: ABNORMAL
EOSINOPHIL # BLD: 0 K/UL (ref 0–0.4)
EOSINOPHIL NFR BLD: 0 % (ref 0–7)
ERYTHROCYTE [DISTWIDTH] IN BLOOD BY AUTOMATED COUNT: 14.3 % (ref 11.5–14.5)
GLUCOSE SERPL-MCNC: 118 MG/DL (ref 65–100)
HCT VFR BLD AUTO: 46.4 % (ref 35–47)
HGB BLD-MCNC: 15.1 G/DL (ref 11.5–16)
IMM GRANULOCYTES # BLD AUTO: 0 K/UL
IMM GRANULOCYTES NFR BLD AUTO: 0 %
LYMPHOCYTES # BLD: 4.5 K/UL (ref 0.8–3.5)
LYMPHOCYTES NFR BLD: 27 % (ref 12–49)
MCH RBC QN AUTO: 28.2 PG (ref 26–34)
MCHC RBC AUTO-ENTMCNC: 32.5 G/DL (ref 30–36.5)
MCV RBC AUTO: 86.6 FL (ref 80–99)
MONOCYTES # BLD: 1.2 K/UL (ref 0–1)
MONOCYTES NFR BLD: 7 % (ref 5–13)
NEUTS SEG # BLD: 10.9 K/UL (ref 1.8–8)
NEUTS SEG NFR BLD: 66 % (ref 32–75)
NRBC # BLD: 0 K/UL (ref 0–0.01)
NRBC BLD-RTO: 0 PER 100 WBC
PLATELET # BLD AUTO: 262 K/UL (ref 150–400)
PLATELET COMMENTS,PCOM: ABNORMAL
PMV BLD AUTO: 10.7 FL (ref 8.9–12.9)
POTASSIUM SERPL-SCNC: 3.7 MMOL/L (ref 3.5–5.1)
RBC # BLD AUTO: 5.36 M/UL (ref 3.8–5.2)
RBC MORPH BLD: ABNORMAL
SODIUM SERPL-SCNC: 142 MMOL/L (ref 136–145)
WBC # BLD AUTO: 16.6 K/UL (ref 3.6–11)

## 2019-06-26 PROCEDURE — 80048 BASIC METABOLIC PNL TOTAL CA: CPT

## 2019-06-26 PROCEDURE — 36415 COLL VENOUS BLD VENIPUNCTURE: CPT

## 2019-06-26 PROCEDURE — 85025 COMPLETE CBC W/AUTO DIFF WBC: CPT

## 2019-06-26 RX ORDER — ALPRAZOLAM 2 MG/1
2 TABLET ORAL
COMMUNITY

## 2019-06-27 NOTE — ADVANCED PRACTICE NURSE
VM left for Dr Ephraim Moore nurse regarding elevated WBC 16.6 and eval note in [facility] from Dr Luke Phipps treating patient for sinusitis and bronchitis, prescribed doxy and prednisone at that time. Requested rtn call if any further assistance is needed. PAT labs routed to PCP.

## 2019-07-16 ENCOUNTER — HOSPITAL ENCOUNTER (OUTPATIENT)
Age: 64
Setting detail: OUTPATIENT SURGERY
Discharge: HOME | End: 2019-07-16
Attending: PLASTIC SURGERY | Admitting: PLASTIC SURGERY
Payer: COMMERCIAL

## 2019-07-16 ENCOUNTER — ANESTHESIA (OUTPATIENT)
Dept: MEDSURG UNIT | Age: 64
End: 2019-07-16
Payer: COMMERCIAL

## 2019-07-16 ENCOUNTER — ANESTHESIA EVENT (OUTPATIENT)
Dept: MEDSURG UNIT | Age: 64
End: 2019-07-16
Payer: COMMERCIAL

## 2019-07-16 VITALS
SYSTOLIC BLOOD PRESSURE: 119 MMHG | DIASTOLIC BLOOD PRESSURE: 55 MMHG | HEART RATE: 87 BPM | RESPIRATION RATE: 16 BRPM | HEIGHT: 61 IN | OXYGEN SATURATION: 92 % | TEMPERATURE: 98.2 F | BODY MASS INDEX: 34.93 KG/M2 | WEIGHT: 185 LBS

## 2019-07-16 DIAGNOSIS — D17.20 LIPOMA OF EXTREMITY: Primary | ICD-10-CM

## 2019-07-16 PROCEDURE — 77030032490 HC SLV COMPR SCD KNE COVD -B: Performed by: PLASTIC SURGERY

## 2019-07-16 PROCEDURE — 76030000004 HC AMB SURG OR TIME 2 TO 2.5: Performed by: PLASTIC SURGERY

## 2019-07-16 PROCEDURE — 74011250637 HC RX REV CODE- 250/637: Performed by: ANESTHESIOLOGY

## 2019-07-16 PROCEDURE — 77030000032 HC CUF TRNQT ZIMM -B: Performed by: PLASTIC SURGERY

## 2019-07-16 PROCEDURE — 74011000250 HC RX REV CODE- 250

## 2019-07-16 PROCEDURE — 77030018836 HC SOL IRR NACL ICUM -A: Performed by: PLASTIC SURGERY

## 2019-07-16 PROCEDURE — 77030026438 HC STYL ET INTUB CARD -A: Performed by: ANESTHESIOLOGY

## 2019-07-16 PROCEDURE — 77030008684 HC TU ET CUF COVD -B: Performed by: ANESTHESIOLOGY

## 2019-07-16 PROCEDURE — 74011250636 HC RX REV CODE- 250/636: Performed by: PLASTIC SURGERY

## 2019-07-16 PROCEDURE — 76210000036 HC AMBSU PH I REC 1.5 TO 2 HR: Performed by: PLASTIC SURGERY

## 2019-07-16 PROCEDURE — 77030002966 HC SUT PDS J&J -A: Performed by: PLASTIC SURGERY

## 2019-07-16 PROCEDURE — 76060000064 HC AMB SURG ANES 2 TO 2.5 HR: Performed by: PLASTIC SURGERY

## 2019-07-16 PROCEDURE — 74011250636 HC RX REV CODE- 250/636: Performed by: ANESTHESIOLOGY

## 2019-07-16 PROCEDURE — 77030040356 HC CORD BPLR FRCP COVD -A: Performed by: PLASTIC SURGERY

## 2019-07-16 PROCEDURE — 77030011640 HC PAD GRND REM COVD -A: Performed by: PLASTIC SURGERY

## 2019-07-16 PROCEDURE — 88304 TISSUE EXAM BY PATHOLOGIST: CPT

## 2019-07-16 PROCEDURE — 77030031139 HC SUT VCRL2 J&J -A: Performed by: PLASTIC SURGERY

## 2019-07-16 PROCEDURE — 77030002916 HC SUT ETHLN J&J -A: Performed by: PLASTIC SURGERY

## 2019-07-16 PROCEDURE — 74011250636 HC RX REV CODE- 250/636

## 2019-07-16 PROCEDURE — 77030020782 HC GWN BAIR PAWS FLX 3M -B

## 2019-07-16 PROCEDURE — 74011000250 HC RX REV CODE- 250: Performed by: PLASTIC SURGERY

## 2019-07-16 RX ORDER — SODIUM CHLORIDE, SODIUM LACTATE, POTASSIUM CHLORIDE, CALCIUM CHLORIDE 600; 310; 30; 20 MG/100ML; MG/100ML; MG/100ML; MG/100ML
50 INJECTION, SOLUTION INTRAVENOUS CONTINUOUS
Status: DISCONTINUED | OUTPATIENT
Start: 2019-07-16 | End: 2019-07-16 | Stop reason: HOSPADM

## 2019-07-16 RX ORDER — SODIUM CHLORIDE 0.9 % (FLUSH) 0.9 %
5-40 SYRINGE (ML) INJECTION EVERY 8 HOURS
Status: DISCONTINUED | OUTPATIENT
Start: 2019-07-16 | End: 2019-07-16 | Stop reason: HOSPADM

## 2019-07-16 RX ORDER — HYDROMORPHONE HYDROCHLORIDE 1 MG/ML
0.2 INJECTION, SOLUTION INTRAMUSCULAR; INTRAVENOUS; SUBCUTANEOUS
Status: DISCONTINUED | OUTPATIENT
Start: 2019-07-16 | End: 2019-07-16 | Stop reason: HOSPADM

## 2019-07-16 RX ORDER — OXYCODONE HYDROCHLORIDE 5 MG/1
5 TABLET ORAL
Status: COMPLETED | OUTPATIENT
Start: 2019-07-16 | End: 2019-07-16

## 2019-07-16 RX ORDER — DEXAMETHASONE SODIUM PHOSPHATE 4 MG/ML
INJECTION, SOLUTION INTRA-ARTICULAR; INTRALESIONAL; INTRAMUSCULAR; INTRAVENOUS; SOFT TISSUE AS NEEDED
Status: DISCONTINUED | OUTPATIENT
Start: 2019-07-16 | End: 2019-07-16 | Stop reason: HOSPADM

## 2019-07-16 RX ORDER — PROPOFOL 10 MG/ML
INJECTION, EMULSION INTRAVENOUS AS NEEDED
Status: DISCONTINUED | OUTPATIENT
Start: 2019-07-16 | End: 2019-07-16 | Stop reason: HOSPADM

## 2019-07-16 RX ORDER — CEFAZOLIN SODIUM/WATER 2 G/20 ML
2 SYRINGE (ML) INTRAVENOUS ONCE
Status: COMPLETED | OUTPATIENT
Start: 2019-07-16 | End: 2019-07-16

## 2019-07-16 RX ORDER — LIDOCAINE HYDROCHLORIDE 10 MG/ML
0.1 INJECTION, SOLUTION EPIDURAL; INFILTRATION; INTRACAUDAL; PERINEURAL AS NEEDED
Status: DISCONTINUED | OUTPATIENT
Start: 2019-07-16 | End: 2019-07-16 | Stop reason: HOSPADM

## 2019-07-16 RX ORDER — SODIUM CHLORIDE 0.9 % (FLUSH) 0.9 %
5-40 SYRINGE (ML) INJECTION AS NEEDED
Status: DISCONTINUED | OUTPATIENT
Start: 2019-07-16 | End: 2019-07-16 | Stop reason: HOSPADM

## 2019-07-16 RX ORDER — GLYCOPYRROLATE 0.2 MG/ML
INJECTION INTRAMUSCULAR; INTRAVENOUS AS NEEDED
Status: DISCONTINUED | OUTPATIENT
Start: 2019-07-16 | End: 2019-07-16 | Stop reason: HOSPADM

## 2019-07-16 RX ORDER — ONDANSETRON 2 MG/ML
INJECTION INTRAMUSCULAR; INTRAVENOUS AS NEEDED
Status: DISCONTINUED | OUTPATIENT
Start: 2019-07-16 | End: 2019-07-16 | Stop reason: HOSPADM

## 2019-07-16 RX ORDER — PROPOFOL 10 MG/ML
INJECTION, EMULSION INTRAVENOUS
Status: DISCONTINUED | OUTPATIENT
Start: 2019-07-16 | End: 2019-07-16 | Stop reason: HOSPADM

## 2019-07-16 RX ORDER — FENTANYL CITRATE 50 UG/ML
INJECTION, SOLUTION INTRAMUSCULAR; INTRAVENOUS AS NEEDED
Status: DISCONTINUED | OUTPATIENT
Start: 2019-07-16 | End: 2019-07-16 | Stop reason: HOSPADM

## 2019-07-16 RX ORDER — FENTANYL CITRATE 50 UG/ML
25 INJECTION, SOLUTION INTRAMUSCULAR; INTRAVENOUS
Status: DISCONTINUED | OUTPATIENT
Start: 2019-07-16 | End: 2019-07-16 | Stop reason: HOSPADM

## 2019-07-16 RX ORDER — SUCCINYLCHOLINE CHLORIDE 20 MG/ML
INJECTION INTRAMUSCULAR; INTRAVENOUS AS NEEDED
Status: DISCONTINUED | OUTPATIENT
Start: 2019-07-16 | End: 2019-07-16 | Stop reason: HOSPADM

## 2019-07-16 RX ORDER — NEOSTIGMINE METHYLSULFATE 1 MG/ML
INJECTION INTRAVENOUS AS NEEDED
Status: DISCONTINUED | OUTPATIENT
Start: 2019-07-16 | End: 2019-07-16 | Stop reason: HOSPADM

## 2019-07-16 RX ORDER — LIDOCAINE HYDROCHLORIDE 20 MG/ML
INJECTION, SOLUTION EPIDURAL; INFILTRATION; INTRACAUDAL; PERINEURAL AS NEEDED
Status: DISCONTINUED | OUTPATIENT
Start: 2019-07-16 | End: 2019-07-16 | Stop reason: HOSPADM

## 2019-07-16 RX ORDER — MIDAZOLAM HYDROCHLORIDE 1 MG/ML
INJECTION, SOLUTION INTRAMUSCULAR; INTRAVENOUS AS NEEDED
Status: DISCONTINUED | OUTPATIENT
Start: 2019-07-16 | End: 2019-07-16 | Stop reason: HOSPADM

## 2019-07-16 RX ORDER — OXYCODONE AND ACETAMINOPHEN 5; 325 MG/1; MG/1
1 TABLET ORAL
Qty: 30 TAB | Refills: 0 | Status: SHIPPED | OUTPATIENT
Start: 2019-07-16 | End: 2019-07-19

## 2019-07-16 RX ORDER — ROCURONIUM BROMIDE 10 MG/ML
INJECTION, SOLUTION INTRAVENOUS AS NEEDED
Status: DISCONTINUED | OUTPATIENT
Start: 2019-07-16 | End: 2019-07-16 | Stop reason: HOSPADM

## 2019-07-16 RX ORDER — ONDANSETRON 2 MG/ML
4 INJECTION INTRAMUSCULAR; INTRAVENOUS AS NEEDED
Status: DISCONTINUED | OUTPATIENT
Start: 2019-07-16 | End: 2019-07-16 | Stop reason: HOSPADM

## 2019-07-16 RX ORDER — MORPHINE SULFATE 10 MG/ML
2 INJECTION, SOLUTION INTRAMUSCULAR; INTRAVENOUS
Status: DISCONTINUED | OUTPATIENT
Start: 2019-07-16 | End: 2019-07-16 | Stop reason: HOSPADM

## 2019-07-16 RX ADMIN — ROCURONIUM BROMIDE 10 MG: 10 INJECTION, SOLUTION INTRAVENOUS at 07:49

## 2019-07-16 RX ADMIN — PROPOFOL 150 MG: 10 INJECTION, EMULSION INTRAVENOUS at 07:49

## 2019-07-16 RX ADMIN — SODIUM CHLORIDE, POTASSIUM CHLORIDE, SODIUM LACTATE AND CALCIUM CHLORIDE 50 ML/HR: 600; 310; 30; 20 INJECTION, SOLUTION INTRAVENOUS at 06:40

## 2019-07-16 RX ADMIN — FENTANYL CITRATE 25 MCG: 50 INJECTION, SOLUTION INTRAMUSCULAR; INTRAVENOUS at 08:41

## 2019-07-16 RX ADMIN — FENTANYL CITRATE 25 MCG: 50 INJECTION, SOLUTION INTRAMUSCULAR; INTRAVENOUS at 07:42

## 2019-07-16 RX ADMIN — DEXAMETHASONE SODIUM PHOSPHATE 8 MG: 4 INJECTION, SOLUTION INTRA-ARTICULAR; INTRALESIONAL; INTRAMUSCULAR; INTRAVENOUS; SOFT TISSUE at 08:02

## 2019-07-16 RX ADMIN — LIDOCAINE HYDROCHLORIDE 60 MG: 20 INJECTION, SOLUTION EPIDURAL; INFILTRATION; INTRACAUDAL; PERINEURAL at 07:49

## 2019-07-16 RX ADMIN — NEOSTIGMINE METHYLSULFATE 3 MG: 1 INJECTION INTRAVENOUS at 09:54

## 2019-07-16 RX ADMIN — ROCURONIUM BROMIDE 20 MG: 10 INJECTION, SOLUTION INTRAVENOUS at 08:41

## 2019-07-16 RX ADMIN — OXYCODONE HYDROCHLORIDE 5 MG: 5 TABLET ORAL at 11:03

## 2019-07-16 RX ADMIN — PROPOFOL 75 MCG/KG/MIN: 10 INJECTION, EMULSION INTRAVENOUS at 08:20

## 2019-07-16 RX ADMIN — FENTANYL CITRATE 75 MCG: 50 INJECTION, SOLUTION INTRAMUSCULAR; INTRAVENOUS at 07:49

## 2019-07-16 RX ADMIN — FENTANYL CITRATE 25 MCG: 50 INJECTION, SOLUTION INTRAMUSCULAR; INTRAVENOUS at 10:54

## 2019-07-16 RX ADMIN — FENTANYL CITRATE 25 MCG: 50 INJECTION, SOLUTION INTRAMUSCULAR; INTRAVENOUS at 10:31

## 2019-07-16 RX ADMIN — Medication 2 G: at 08:03

## 2019-07-16 RX ADMIN — PROPOFOL 50 MG: 10 INJECTION, EMULSION INTRAVENOUS at 08:41

## 2019-07-16 RX ADMIN — FENTANYL CITRATE 25 MCG: 50 INJECTION, SOLUTION INTRAMUSCULAR; INTRAVENOUS at 08:38

## 2019-07-16 RX ADMIN — SUCCINYLCHOLINE CHLORIDE 120 MG: 20 INJECTION INTRAMUSCULAR; INTRAVENOUS at 07:49

## 2019-07-16 RX ADMIN — GLYCOPYRROLATE 0.4 MG: 0.2 INJECTION INTRAMUSCULAR; INTRAVENOUS at 09:54

## 2019-07-16 RX ADMIN — ONDANSETRON 4 MG: 2 INJECTION INTRAMUSCULAR; INTRAVENOUS at 08:11

## 2019-07-16 RX ADMIN — MIDAZOLAM HYDROCHLORIDE 2 MG: 1 INJECTION, SOLUTION INTRAMUSCULAR; INTRAVENOUS at 07:42

## 2019-07-16 NOTE — ANESTHESIA POSTPROCEDURE EVALUATION
Post-Anesthesia Evaluation and Assessment    Patient: Lashonda Anglin MRN: [id number]  SSN: [id number]    YOB: 1955  Age: 66 y.o. Sex: female      I have evaluated the patient and they are stable and ready for discharge from the PACU. Cardiovascular Function/Vital Signs  Visit Vitals  /55 (BP 1 Location: Left arm, BP Patient Position: At rest)   Pulse 87   Temp 36.8 °C (98.2 °F)   Resp 16   Ht 5' 1\" (1.549 m)   Wt 83.9 kg (185 lb)   SpO2 92%   BMI 34.96 kg/m²       Patient is status post General anesthesia for Procedure(s):  REMOVAL OF RIGHT SHOULDER INTRAMUSCULAR LIPOMA WITH COMPLEX CLOSURE  REMOVAL OF RIGHT FOREARM LIPOMA WITH COMPLEX CLOSURE. Nausea/Vomiting: None    Postoperative hydration reviewed and adequate. Pain:  Pain Scale 1: Numeric (0 - 10) (07/16/19 1100)  Pain Intensity 1: 5 (07/16/19 1100)   Managed    Neurological Status:   Neuro (WDL): Within Defined Limits (07/16/19 1010)  Neuro  LUE Motor Response: Purposeful (07/16/19 1010)  LLE Motor Response: Purposeful (07/16/19 1010)  RUE Motor Response: Purposeful (07/16/19 1010)  RLE Motor Response: Purposeful (07/16/19 1010)   At baseline    Mental Status, Level of Consciousness: Alert and  oriented to person, place, and time    Pulmonary Status:   O2 Device: Room air (07/16/19 1115)   Adequate oxygenation and airway patent    Complications related to anesthesia: None    Post-anesthesia assessment completed. No concerns    Signed By: Jaguar Ulloa MD     July 16, 2019              Procedure(s):  REMOVAL OF RIGHT SHOULDER INTRAMUSCULAR LIPOMA WITH COMPLEX CLOSURE  REMOVAL OF RIGHT FOREARM LIPOMA WITH COMPLEX CLOSURE.    general    <BSHSIANPOST>    Vitals Value Taken Time   /55 7/16/2019 11:15 AM   Temp 36.8 °C (98.2 °F) 7/16/2019 10:12 AM   Pulse 89 7/16/2019 11:24 AM   Resp 19 7/16/2019 11:24 AM   SpO2 93 % 7/16/2019 11:24 AM   Vitals shown include unvalidated device data.

## 2019-07-16 NOTE — DISCHARGE INSTRUCTIONS
DISCHARGE SUMMARY from Nurse    PATIENT INSTRUCTIONS:    After general anesthesia or intravenous sedation, for 24 hours or while taking prescription Narcotics:  · Limit your activities  · Do not drive and operate hazardous machinery  · Do not make important personal or business decisions  · Do  not drink alcoholic beverages  · If you have not urinated within 8 hours after discharge, please contact your surgeon on call. Report the following to your surgeon:  · Excessive pain, swelling, redness or odor of or around the surgical area  · Temperature over 100.5  · Nausea and vomiting lasting longer than 4 hours or if unable to take medications  · Any signs of decreased circulation or nerve impairment to extremity: change in color, persistent  numbness, tingling, coldness or increase pain  · Any questions    What to do at Home:  Recommended activity: See surgical instructions, as noted above. If you experience any of the following symptoms noted above, please follow up with Dr. Mich Li. *  Please give a list of your current medications to your Primary Care Provider. *  Please update this list whenever your medications are discontinued, doses are      changed, or new medications (including over-the-counter products) are added. *  Please carry medication information at all times in case of emergency situations. These are general instructions for a healthy lifestyle:    No smoking/ No tobacco products/ Avoid exposure to second hand smoke  Surgeon General's Warning:  Quitting smoking now greatly reduces serious risk to your health.     Obesity, smoking, and sedentary lifestyle greatly increases your risk for illness    A healthy diet, regular physical exercise & weight monitoring are important for maintaining a healthy lifestyle    You may be retaining fluid if you have a history of heart failure or if you experience any of the following symptoms:  Weight gain of 3 pounds or more overnight or 5 pounds in a week, increased swelling in our hands or feet or shortness of breath while lying flat in bed. Please call your doctor as soon as you notice any of these symptoms; do not wait until your next office visit. The discharge information has been reviewed with the patient and caregiver. The patient and caregiver verbalized understanding. Discharge medications reviewed with the patient and caregiver and appropriate educational materials and side effects teaching were provided. ___________________________________________________________________________________________________________________________________   Jonathon Ashraf M.D. Instructions after Minor Plastic Surgery Procedures      You have had a minor surgical procedure. Please follow these simple instructions to help ensure a safe and comfortable recovery. Any questions can be directed to the office at [phone]. 1. If a bandage has been placed, it can be removed or changed at 24-48 hours after surgery. Wounds of the scalp are not usually bandaged, except in special situations. 2. Expect a modest amount of redness (inflammation) and possibly some bruising to appear in the days following your surgery. This is normal and will resolve as the wound heals, but may persist until the stitches have been removed. 3. The appearance of excessive wound redness, pus or fever is not normal and should be reported to the office. 4. Wounds may be gently washed with mild soap and water beginning 24 hours after surgery, then patted dry. Scalp wounds may be gently washed (mild shampoo) and gently dried as well. 5. Antibiotic ointment should be lightly applied 2-3 times per day to any wound. Replace Band-Aid or other dressing as instructed. 6. Suture removal will be arranged in 5-14 days, depending upon the site. The pathologists report will be discussed with you then. Your appointment is _______________________.      7. Mild to moderate pain is to be expected after minor surgery and will generally be relieved by the use of aspirin, Tylenol or ibuprofen agents (Advil, Motrin, Nuprin, etc.) You have been given a prescription for ______________________. 8. Swelling or discomfort will be improved by elevating the surgical site above the level of the heart, especially the face, scalp or arms, which can be elevated in bed by extra pillows. 9. Do not be concerned if one or even several sutures come out prior to the time of suture removal. It is not unusual for this to occur as the wound swelling decreases. Support of the remaining sutures is sufficient to protect your wound(s). 10. If appropriate, copies of the surgery and pathology reports will be sent to your doctor. 11. Please call the office at [phone] if you have any questions.     I acknowledge receipt of the above discharge instructions:    Patient/Guardian Signature _______________________________________ Date___________________    Lola Thai Signature_______________________________________________ Date___________________

## 2019-07-16 NOTE — ROUTINE PROCESS
Patient: Guanaco Rosas MRN: [id number]  SSN: [id number] YOB: 1955  Age: 66 y.o. Sex: female Patient is status post Procedure(s): REMOVAL OF RIGHT SHOULDER INTRAMUSCULAR LIPOMA WITH COMPLEX CLOSURE 
REMOVAL OF RIGHT FOREARM LIPOMA WITH COMPLEX CLOSURE. Surgeon(s) and Role: * Kristy Caldwell MD - Primary Local/Dose/Irrigation:  SEE MAR Peripheral IV 07/16/19 Left Forearm (Active) Dressing Status Clean, dry, & intact 7/16/2019  6:58 AM  
Dressing Type Transparent 7/16/2019  6:58 AM  
        
Airway - Endotracheal Tube 07/16/19 Oral (Active) Dressing/Packing:  Wound Arm Right-Dressing Type: 4 x 4;Elastic bandage;Gauze wrap (marcela) (07/16/19 0900) Wound Shoulder Right-Dressing Type: 4 x 4;Special tape (comment)(MEDIPORE TAPE) (07/16/19 0900) Splint/Cast:  ] Other:

## 2019-07-16 NOTE — ANESTHESIA PREPROCEDURE EVALUATION
Relevant Problems   No relevant active problems       Anesthetic History     PONV          Review of Systems / Medical History  Patient summary reviewed, nursing notes reviewed and pertinent labs reviewed    Pulmonary        Sleep apnea: CPAP    Asthma        Neuro/Psych              Cardiovascular    Hypertension              Exercise tolerance: >4 METS     GI/Hepatic/Renal     GERD           Endo/Other      Hypothyroidism  Obesity and arthritis     Other Findings              Physical Exam    Airway  Mallampati: I  TM Distance: > 6 cm  Neck ROM: normal range of motion   Mouth opening: Normal     Cardiovascular    Rhythm: regular  Rate: normal         Dental  No notable dental hx       Pulmonary  Breath sounds clear to auscultation               Abdominal         Other Findings            Anesthetic Plan    ASA: 3  Anesthesia type: general          Induction: Intravenous  Anesthetic plan and risks discussed with: Patient

## 2019-07-16 NOTE — BRIEF OP NOTE
BRIEF OPERATIVE NOTE    Date of Procedure: 7/16/2019   Preoperative Diagnosis: BENIGN NEOPLASM RIGHT SHOULDER AND RIGHT FOREARM  Postoperative Diagnosis: BENIGN NEOPLASM RIGHT SHOULDER AND RIGHT FOREARM    Procedure(s):  REMOVAL OF RIGHT SHOULDER INTRAMUSCULAR LIPOMA WITH COMPLEX CLOSURE  REMOVAL OF RIGHT FOREARM LIPOMA WITH COMPLEX CLOSURE  Surgeon(s) and Role:     Jose Maria Price MD - Primary         Surgical Assistant:  Desiree Corrales RN    Surgical Staff:  Circ-1: Goran Dillon RN  Circ-Relief: Nawaf Melendez RN  Registered Nurse Assistant: Jeffrey Cote RN  Scrub Tech-1: Iram Osorio  Event Time In Time Out   Incision Start 0381    Incision Close 8412      Anesthesia: General   Estimated Blood Loss: 150ml  Specimens:   ID Type Source Tests Collected by Time Destination   1 : RIGHT LOWER FOREARM LIPOMA Fresh   Nalini Ly MD 7/16/2019 3725 Pathology   2 : RIGHT ULNAR VOLAR FOREARM MASS Fresh   Nalini Ly MD 7/16/2019 0477 Pathology   3 : RIGHT SHOULDER LIPOMA Fresh   Nalini Ly MD 7/16/2019 5837 Pathology      Findings: intramuscular adherent lipomas   Complications: prolonged bleeding  Implants: * No implants in log *

## 2019-07-16 NOTE — H&P
Surgery History and Physical    Subjective:      Idalia Anders is a 66 y.o.  female who presents with multiple lipomas right upper extremity.     Patient Active Problem List    Diagnosis Date Noted    Severe obesity ([address] 12/19/2018    Advanced directives, counseling/discussion 01/08/2016    Phyllodes tumor, benign 12/16/2014    Phyllodes tumor, left 03/27/2014    Personal history of malignant melanoma of skin 07/31/2012     Past Medical History:   Diagnosis Date    Adrenal cancer ([address] 1996    Adverse effect of anesthesia 2014    \"stopped breathing during MAC, diagnosed with sleep apnea at that time\"    Anxiety and depression     Arthritis     pt not aware of this diagnosis    Asthma 2000s    mostly resolved    Autoimmune disease ([address]     VITALIGO    Bronchitis     yearly    Chronic kidney disease 1980'S    STONES    Colon polyps     Diverticulitis     Diverticulosis     GERD (gastroesophageal reflux disease)     Hypertension     Hypothyroid     Ill-defined condition     \"trouble focusing\"    Ill-defined condition     cyst, back of head    Ill-defined condition     Kidney stones 1980s    Lipoma     front of right shoulder, right AC    Nausea & vomiting     after surgery in 1997, no problems since    Palpitations     Psychiatric disorder     ANXIETY AND DEPRESSION    Retinal tear left    partial, has floaters in vision    Shingles 12/2015    Skin cancer 2004    melanoma- L flank    Skin cancer 2006    melanoma- L frontal scalp    Skin cancer     MID BACK    Sleep apnea     uses CPAP    Sun-damaged skin     Tanning bed exposure     Vitiligo     \"melanoma induced vitiligo\"      Past Surgical History:   Procedure Laterality Date    HX BREAST BIOPSY  3/27/2014    EXCISION OF LEFT BREAST NODULE performed by Salvador Fisher MD at [address] [place] [address] HX BREAST LUMPECTOMY Left 2/12/2019    LEFT BREAST LUMPECTOMY WITH ULTRASOUND performed by Delos Merlin., MD at [facility]    HX BREAST LUMPECTOMY Left 2/28/2019    LEFT BREAST RE EXCISION LUMPECTOMY performed by Emma Medina MD at [facility]UL[facility]    HX BREAST LUMPECTOMY      pt states not arm restricted    HX CHOLECYSTECTOMY  1996    HX COLONOSCOPY      HX GI      COLONOSCOPY    HX GYN  2013    uterine ablation    HX HEENT Left 2019    laser surgery for retinal tear    HX OTHER SURGICAL      2 melanoma surgeries, left flank and left head    HX OTHER SURGICAL  1997    adrenalectomy    HX WISDOM TEETH EXTRACTION  2000      Social History     Tobacco Use    Smoking status: Never Smoker    Smokeless tobacco: Never Used   Substance Use Topics    Alcohol use: Yes     Comment: 1 drink/week (may be wine, beer, or liquor)      Family History   Problem Relation Age of Onset    Breast Cancer Maternal Grandmother         66's    Other Mother         ANEURSYM    Suicide Mother       Prior to Admission medications    Medication Sig Start Date End Date Taking? Authorizing Provider   ALPRAZolam Schmitz Abed) 2 mg tablet Take 2 mg by mouth nightly. Yes Provider, Historical   albuterol (PROVENTIL HFA, VENTOLIN HFA, PROAIR HFA) 90 mcg/actuation inhaler Take 2 Puffs by inhalation every four (4) hours as needed for Wheezing. 6/21/19  Yes Rosalind Vasquez MD   ibuprofen (ADVIL) 200 mg tablet Take 600 mg by mouth every six (6) hours as needed for Pain. Yes Provider, Historical   hydrochlorothiazide (HYDRODIURIL) 25 mg tablet Take 25 mg by mouth every morning. 11/3/15  Yes Provider, Historical   esomeprazole (NEXIUM) 20 mg capsule Take 20 mg by mouth every morning. Yes Provider, Historical   levothyroxine (SYNTHROID) 150 mcg tablet Take 150 mcg by mouth Daily (before breakfast). Yes Provider, Historical   potassium chloride SR (KLOR-CON 10) 10 mEq tablet Take 10 mEq by mouth daily. Yes Provider, Historical   escitalopram (LEXAPRO) 20 mg tablet Take 20 mg by mouth every morning.    Yes Provider, Historical dextroamphetamine-amphetamine (ADDERALL) 20 mg tablet Take 20 mg by mouth two (2) times a day. Provider, Historical   ascorbate calcium (VITAMIN C PO) Take 1 Tab by mouth daily as needed. Provider, Historical   ZINC PO Take 1 Tab by mouth daily as needed. Provider, Historical   ascorbic acid/multivit-min (EMERGEN-C PO) Take 1 Package by mouth daily as needed. Provider, Historical   CALCIUM PO Take 1 Tab by mouth daily as needed. Provider, Historical   LORazepam (ATIVAN) 1 mg tablet TAKE 1 TABLET BY MOUTH TWICE A DAY AS NEEDED FOR ANXIETY 9/15/17   Provider, Historical   cholecalciferol, vitamin D3, (VITAMIN D3) 2,000 unit Tab Take 2,000 Units by mouth daily. Provider, Historical     No Known Allergies      Review of Systems:    A comprehensive review of systems was negative except for that written in the History of Present Illness. Objective:     Patient Vitals for the past 8 hrs:   BP Temp Pulse Resp SpO2 Height Weight   19 0623 141/86 98.4 °F (36.9 °C) 90 18 98 % 5' 1\" (1.549 m) 83.9 kg (185 lb)       Temp (24hrs), Av.4 °F (36.9 °C), Min:98.4 °F (36.9 °C), Max:98.4 °F (36.9 °C)      Physical Exam:  General:  Alert, cooperative, no distress, appears stated age. Eyes:  Conjunctivae/corneas clear. PERRL, EOMs intact. Fundi benign   Ears:  Normal TMs and external ear canals both ears. Nose: Nares normal. Septum midline. Mucosa normal. No drainage or sinus tenderness. Mouth/Throat: Lips, mucosa, and tongue normal. Teeth and gums normal.   Neck: Supple, symmetrical, trachea midline, no adenopathy, thyroid: no enlargment/tenderness/nodules, no carotid bruit and no JVD. Back:   Symmetric, no curvature. ROM normal. No CVA tenderness. Lungs:   Clear to auscultation bilaterally. Heart:  Regular rate and rhythm, S1, S2 normal, no murmur, click, rub or gallop. Abdomen:   Soft, non-tender. Bowel sounds normal. No masses,  No organomegaly.    Extremities: Extremities normal, atraumatic, no cyanosis or edema. Pulses: 2+ and symmetric all extremities. Skin: Skin color, texture, turgor normal. No rashes or lesions. Large lipoma right shoulder  Lipoma x 2 forearm   Lymph nodes: Cervical, supraclavicular, and axillary nodes normal.   Neurologic: CNII-XII intact. Normal strength, sensation and reflexes throughout. Labs: No results found for this or any previous visit (from the past 24 hour(s)). Data Review:    CBC:   Lab Results   Component Value Date/Time    WBC 16.6 (H) 06/26/2019 08:44 AM    RBC 5.36 (H) 06/26/2019 08:44 AM    HGB 15.1 06/26/2019 08:44 AM    HCT 46.4 06/26/2019 08:44 AM    PLATELET 206 47/75/4261 08:44 AM        Assessment:     Active Problems:    * No active hospital problems.  *      Plan:     Excision of lipomas

## 2019-07-16 NOTE — OP NOTES
[address]   OPERATIVE REPORT    Name:  Melissa Self  MR#:  [id number]  :  1955  ACCOUNT #:  [de-identified]  DATE OF SERVICE:  2019    PREOPERATIVE DIAGNOSIS:  Intramuscular lipoma, right shoulder and right forearm x2. POSTOPERATIVE DIAGNOSIS:  Intramuscular lipoma, right shoulder and right forearm x2. PROCEDURE PERFORMED:  Excision of submuscular lipomas. SURGEON:  Alpa Lewis MD    ASSISTANT:  Desiree Corrales RN    ANESTHESIA:  General.    COMPLICATIONS:  None. SPECIMENS REMOVED:  Right shoulder lipoma, right volar medial lipoma, and right volar ulnar lipoma. IMPLANTS:  none. ESTIMATED BLOOD LOSS:  150 mL. DRAINS AND TUBES:  None. INDICATIONS:  The patient is a 80-year-old white female. She has had longstanding lipomas of her right arm. They are becoming more painful. She is here today for excision. Prior to procedure, the risks and benefits of surgery were discussed with the patient. Informed consent was obtained. PROCEDURE:  The patient was seen in the morning of surgery. These areas were marked. She was taken to the operating room and placed supine on the operating table. After induction of general anesthesia, the patient was in supine position and arm board was placed. The right upper extremity was prepped and draped in standard fashion. Following preoperative time-out, I began the case. I infiltrated these areas with 0.25% Marcaine containing epinephrine, mixed with 1% lidocaine containing epinephrine to aid in hemostasis as well as postoperative analgesia. Starting off from the volar forearm, I made a medial incision, a 2 cm incision, dissected through the skin and subcuticular tissue directly overlying the mobile wad along the extensor compartment. Once I got here, I noticed that the lipoma was in fact underneath the fascia lying directly on top of the muscle.   I split the muscle fascia removing a small lipoma and then obtained hemostasis with electrocautery. I then closed this area with interrupted Vicryl for the muscular fascia, the dermis, and then nylon for the skin. I then made an incision on more ulnar side. Lesion in this area was deeper. Once again, I dissected through here and went through the muscle fascia seeing the lipoma. This lipoma was really adherent to the muscle. As I dissected on top of the muscle, I nicked a branch of intramuscular vein and it was difficult to obtain control. I removed as much as lipoma as I could and held direct pressure. While the assistant was holding direct pressure, I made an incision overlying the shoulder lipoma approximately 5 cm in width and then dissected through the skin and subcutaneous tissue directly overlying the fascia overlying the deltoids. I dissected this through the deltoids. The lipoma in fact emanated from in between the deltoids and this was dissected all the way down and removed. Hemostasis was achieved with electrocautery. The size of this lesion was approximately 9 cm. Once this was removed, hemostasis was achieved with electrocautery. I then turned my attention back to the ulnar forearm area. It was still bleeding quite a bit. I then placed a tourniquet and elevated the arm and exsanguinated it. I then explored this area several times, opening it up. I finally was able to identify the venous bleeders and suture ligate them. Once this was done, we let the tourniquet down. There was no additional blood loss at this point. I then irrigated out this space, reapproximated the muscular fascia overlying the flexor complex on that side. Once this was done, I then reapproximated the dermis and then the skin. The total length of closure for all three areas was 3+2+5, total 10 cm complex closure. A soft dressing was applied. The patient was then extubated and brought from the operating room to PACU in stable condition.       Brendon Howe MD [email or web address]  D:  07/16/2019 12:46  T:  07/16/2019 14:16  JOB #:  [id number]

## 2019-07-17 ENCOUNTER — PATIENT OUTREACH (OUTPATIENT)
Dept: OTHER | Age: 64
End: 2019-07-17

## 2019-07-17 NOTE — PROGRESS NOTES
Patient eligible for [employer] care management. Received notification of outpatient surgery at [facility] on 19 for excision of submuscular lipomas of right shoulder and right forearm. Care Manager contacted the patient, verified  and zip code as identifiers. Provided introduction and explanation of the Nurse Care Manager role. Agreed to CM follow-up and assistance at this time. PMH:   Past Medical History:   Diagnosis Date    Adrenal cancer ([address]     Adverse effect of anesthesia     \"stopped breathing during MAC, diagnosed with sleep apnea at that time\"    Anxiety and depression     Arthritis     pt not aware of this diagnosis    Asthma     mostly resolved    Autoimmune disease ([address]     VITALIGO    Bronchitis     yearly    Chronic kidney disease     STONES    Colon polyps     Diverticulitis     Diverticulosis     GERD (gastroesophageal reflux disease)     Hypertension     Hypothyroid     Ill-defined condition     \"trouble focusing\"    Ill-defined condition     cyst, back of head    Ill-defined condition     Kidney stones 1980s    Lipoma     front of right shoulder, right AC    Nausea & vomiting     after surgery in , no problems since    Palpitations     Psychiatric disorder     ANXIETY AND DEPRESSION    Retinal tear left    partial, has floaters in vision    Shingles 2015    Skin cancer 2004    melanoma- L flank    Skin cancer 2006    melanoma- L frontal scalp    Skin cancer     MID BACK    Sleep apnea     uses CPAP    Sun-damaged skin     Tanning bed exposure     Vitiligo     \"melanoma induced vitiligo\"     Patient is a 67 yo female with longstanding history of painful, mobility limiting, lipomas of right shoulder and arm; First postop day s/p excision of Right shoulder submuscular lipoma, right volar medial lipoma, and right volar ulnar lipoma.   Personal history of malignant melanoma of skin; HTN; Asthma; GERD;    Care management discharge assessment completed. Surgical/Wound Condition Focused Assessment    Skin- any open wounds or incisions? yes  Description and location of wound-    Right shoulder wound, covered by dressing;   Right forearm wound sites each covered by dressing; In the last 24 hour have you experienced; Fever no    Low body temperature no    Chills or shaking no    Sweating no    Fast heart rate no    Fast breathing no    Dizziness/lightheadedness no    Confusion or unusual change in mental status no    Diarrhea no    Nausea no    Vomiting no    Shortness of breath or difficulty breathing no    Less urine output no    Cold, clammy, and pale skin no     Skin rash or skin color changes no  New or worsening pain? yes  If yes, pain rated 0-10: 5 / 10; Incisional site pain, aching and tenderness;    New or worsening numbness or tingling? no  If yes, location of numbness and tingling:  NA    Activity level- moving several times a day, or as recommended? yes  Abnormal activity level reported: No   Bathing and showering instructions? yes  Nutrition- prescribed diet? yes   Tolerating food? yes  Hydration- (how much in ounces per day) 40ozs water  Medications- new antibiotic? no             Pain medication? yes  Does patient understand how and when to take their medications? yes     Postop Red Flags  Report the following to your surgeon:  · Excessive pain, swelling, redness or odor of or around the surgical sites;  · Fever greater than 100.5;  · Nausea and vomiting lasting longer than 4 hrs or if unable to take medications;  · Any signs of decreased circulation in the arm, change in color, persistent numbness, tingling, coldness or increased pain;     Initial Plan of Care as discussed and agreed with patient:    Impaired Skin Integrity   Goal:  Demonstrates no signs of infection or red flags in next 30 days postop  · States dressings to each of the three incision sites, dry and intact;   · Denies seeing any bleeding or drainage on dressing;  · Reviewed dressing instructions:  · States completed initial dressing change today;  · Verbalized key points of dressing change and frequency;  · Teach back on red flags and signs infection performed;  · Educated on signs and symptoms of infection to monitor when dressing removed;  · Reviewed red flags to monitor during postop;  · Discussed importance of notifying physician if clinical changes noted;  · Teach back on red flags and signs infection performed;    Acute Pain postop  GOAL:  Verbalize pain control with return to baseline activity, self-care, and elimination habits. · Pain 5/10 scale, controlled with Percocet tabs prn;  · States tenderness at incision sites;  · Reports placing ice packs to wound sites periodically for pain control;  · Denies breakthrough pain or need for additional treatment;  · Limited movement of R arm postop secondary to pain;    Learning Need, postop DC instructions, FU appt  Goal:   Completes all postop appointments as ordered at DC;  · Reviewed DC instructions and FU appointments with patient;  · Verbalized good understanding of instructions;  · Reports she need to call to schedule initial postop appt with Surgeon;  · Offered to obtain appt for her, declined;    Potential Barriers to Self-Management or Care Access:   []  Decline in memory    []  Language barrier  []  Emotional   []  Limited mobility  []  Lack of motivation     [] Vision, hearing or cognitive impairment  [x]  Knowledge    [] Financial Barriers     [x]  Pain       Current Outpatient Medications   Medication Sig    oxyCODONE-acetaminophen (PERCOCET) 5-325 mg per tablet Take 1 Tab by mouth every six (6) hours as needed for Pain for up to 3 days. Max Daily Amount: 4 Tabs. Indications: Pain    ALPRAZolam (XANAX) 2 mg tablet Take 2 mg by mouth nightly.  dextroamphetamine-amphetamine (ADDERALL) 20 mg tablet Take 20 mg by mouth two (2) times a day.     ascorbate calcium (VITAMIN C PO) Take 1 Tab by mouth daily as needed.  ascorbic acid/multivit-min (EMERGEN-C PO) Take 1 Package by mouth daily as needed.  CALCIUM PO Take 1 Tab by mouth daily as needed.  LORazepam (ATIVAN) 1 mg tablet TAKE 1 TABLET BY MOUTH TWICE A DAY AS NEEDED FOR ANXIETY    hydrochlorothiazide (HYDRODIURIL) 25 mg tablet Take 25 mg by mouth every morning.  esomeprazole (NEXIUM) 20 mg capsule Take 20 mg by mouth every morning.  levothyroxine (SYNTHROID) 150 mcg tablet Take 150 mcg by mouth Daily (before breakfast).  potassium chloride SR (KLOR-CON 10) 10 mEq tablet Take 10 mEq by mouth daily.  escitalopram (LEXAPRO) 20 mg tablet Take 20 mg by mouth every morning.  cholecalciferol, vitamin D3, (VITAMIN D3) 2,000 unit Tab Take 2,000 Units by mouth daily.  albuterol (PROVENTIL HFA, VENTOLIN HFA, PROAIR HFA) 90 mcg/actuation inhaler Take 2 Puffs by inhalation every four (4) hours as needed for Wheezing.  ibuprofen (ADVIL) 200 mg tablet Take 600 mg by mouth every six (6) hours as needed for Pain.  ZINC PO Take 1 Tab by mouth daily as needed. No current facility-administered medications for this visit. Completed a review of medications with patient, who verbalized understanding of how and when to take medications. Barriers to Medication Adherence:  No barriers identified; Future Appointments   Date Time Provider Anastasia Sosa   7/60/8327  3:64 AM Angela Pantoja MD [facility] [address]       Patient verbalized understanding of all information discussed. Pt has my contact information for any further questions, concerns, or needs.     Plan for next call:  FU on incision site in one week, states probably be back at work but will call me back;

## 2019-07-26 ENCOUNTER — PATIENT OUTREACH (OUTPATIENT)
Dept: OTHER | Age: 64
End: 2019-07-26

## 2019-07-26 NOTE — PROGRESS NOTES
Care Manager contacted the patient by telephone in follow up. Verified  and zip code with patient as identifiers. 67 yo female one week s/p excision of submuscular lipomas of right shoulder and right forearm; Assessment of clinical changes and knowledge demonstrated since last call:   Ongoing Plan of Care:      Impaired Skin Integrity   Goal:  Demonstrates no signs of infection or red flags in next 30 days postop  ? Sites open to air, no drainage;  ? Denies any signs of infections at sites;  ? Denies impaired arm shoulder movement, except for tenderness at times;     Acute Pain postop  GOAL:  Verbalize pain control with return to baseline activity, self-care, and elimination habits. ? Pain 2/10 on pain scale;   ? Reports pain controlled with Percocet tabs at bedtime and Tylenol prn;  ? States tenderness at incision sites;  ? No longer uses ice, pain lessened;     Learning Need, postop DC instructions, FU appt  Goal:     Completes all postop appointments as ordered at DC;  ? Reports following activity restrictions from DC instructions;  ? Reports initial postop appt on 19; Any recurrence Red Flags or continued symptoms: None    Medication Regimen Change:  No changes; Completed a review of medications with patient, who verbalized understanding of how and when to take medications. Barriers / Adherence with medications:  No identified issues;    Upcoming Appointments:    Future Appointments   Date Time Provider Anastasia Sosa   [id number]  5:16 AM Jairo Man MD [facility] [address] [place]       Patient asked questions appropriately and denied any additional needs at this time. Patient verbalized understanding of all information discussed. Patient has my name and contact information for any follow up needs or questions. Plan next call:    Will continue to follow for red flags, other signs of complications;

## 2019-08-12 ENCOUNTER — PATIENT OUTREACH (OUTPATIENT)
Dept: OTHER | Age: 64
End: 2019-08-12

## 2019-08-12 NOTE — PROGRESS NOTES
Care Manager contacted the patient by telephone in follow up. Verified  and zip code with patient as identifiers. 67 yo female 3 weeks s/p excision of submuscular lipomas of right shoulder and right forearm;  States doing very well, no pain;  Return to usual activity;    Assessment of clinical changes and knowledge demonstrated since last call:   Ongoing Plan of Care:     Impaired Skin Integrity   Goal:  Demonstrates no signs of infection or red flags in next 30 days postop  ? Denies signs of infections at lipoma excision sites;  ? Denies drainage or open areas of incision sites;     Acute Pain postop  GOAL Met:  Verbalize pain control with return to baseline activity, self-care, and elimination habits.   ? Pain has resolved;    ? Resumed PLOF without difficulty;     Learning Need, postop DC instructions, FU appt  Goal:     Completes all postop appointments as ordered at DC;  ? Reports following activity restrictions from DC instructions;  ? Reports initial postop appt on  with Dr. Elizabeth Mcfadden; Review and discussion of plan of care with patient, who has provided input to plan, verbalized understanding and agrees with current goals. Any recurrence Red Flags or continued symptoms: None;    Medication Regimen Change:  No changes  Completed a review of medications with patient, who verbalized understanding of how and when to take medications. Barriers / Adherence with medications:  none    Upcoming Appointments:    Future Appointments   Date Time Provider Anastasia Sosa     7:43 AM Mona Skaggs MD [facility] [address]       Patient asked questions appropriately and denied any additional needs at this time. Patient verbalized understanding of all information discussed. Patient has my name and contact information for any follow up needs or questions.      Plan next call:   Awaiting initial postop FY appt which was scheduled at 3 mos postop;

## 2019-09-04 ENCOUNTER — PATIENT OUTREACH (OUTPATIENT)
Dept: OTHER | Age: 64
End: 2019-09-04

## 2019-09-04 NOTE — PROGRESS NOTES
Care Manager contacted the patient by telephone in follow up. Verified  and zip code with patient as identifiers. 67 yo female 79 weeks s/p excision of submuscular lipomas of right shoulder and right forearm;  States doing very well, pain has resolved;  Reports doing well, continues with postop restrictions, awaiting postop visit with surgeon in another week for FU; Assessment of clinical changes and knowledge demonstrated since last call:   Ongoing Plan of Care:     Impaired Skin Integrity   Goal:  Demonstrates no signs of infection or red flags in next 30 days postop  ? Patient denies red flags or signs of infection;  ? Awaiting postop appt on  with Dr. Judie Angelo met:  [facility];  Verbalize pain control with return to baseline activity, self-care, and elimination habits.     Review and discussion of plan of care with patient, who has provided input to plan, verbalized understanding and agrees with current goals. Any recurrence Red Flags or continued symptoms: None    Medication Regimen Change:  No changes  Completed a review of medications with patient, who verbalized understanding of how and when to take medications. Barriers / Adherence with medications:  None identified;    Upcoming Appointments:    Future Appointments   Date Time Provider Anastasia Sosa     3:85 AM Peter Crowder MD [facility] [address]       Patient asked questions appropriately and denied any additional needs at this time. Patient verbalized understanding of all information discussed. Patient has my name and contact information for any follow up needs or questions.      Plan next call:   Await postop visit to ensure goal met, and ready to resolve on last FU;

## 2019-09-17 ENCOUNTER — PATIENT OUTREACH (OUTPATIENT)
Dept: OTHER | Age: 64
End: 2019-09-17

## 2019-09-17 NOTE — PROGRESS NOTES
Care Manager contacted the patient by telephone in follow up. Verified  and zip code with patient as identifiers. Resolving current episode of case management. Patient has met patient stated and/or medical goals. 65 yo female now 2 months postop who has completed all postop  appointments:     Goals Met with Plan of Care:   Completes all postop appointments as ordered at DC;  Verbalize pain control with return to baseline activity, self-care, and elimination habits  Demonstrates no signs of infection or red flags in next 30 days postop    Patient consistenly demonstrates understanding of the medical action plan through execution of plan. Appointments with key providers are scheduled and attended. Plan of care modified and updated to address new challenges and barriers with minimal support from the CM team (proactively uses physicians and community resources). The support system remains current and has been modified as needed. Patient continues to acquire needed resources from the current support system established. Teach back demonstrates that patient understands education for self management of chronic conditions. Patient consistenly communicates understanding of signs,symptoms and complications for all major diagnoses. Patient modifies his/her lifestyle to reduce or avoid risk factors to his/her health. ECM will follow as needed and patient has ECM contact information for future needs.

## 2019-09-25 ENCOUNTER — OFFICE VISIT (OUTPATIENT)
Dept: SURGERY | Age: 64
End: 2019-09-25

## 2019-09-25 VITALS
HEART RATE: 94 BPM | DIASTOLIC BLOOD PRESSURE: 80 MMHG | HEIGHT: 61 IN | WEIGHT: 205 LBS | SYSTOLIC BLOOD PRESSURE: 164 MMHG | BODY MASS INDEX: 38.71 KG/M2

## 2019-09-25 DIAGNOSIS — D48.62 PHYLLODES TUMOR, LEFT: ICD-10-CM

## 2019-09-25 NOTE — PROGRESS NOTES
HISTORY OF PRESENT ILLNESS Gay Flaherty is a 66 y.o. female. HPI ESTABLISHED patient here for 6 mo follow-up, s/p LEFT breast lumpectomy for phyllodes tumor. She states she has had no problems at surgical site and has no other breast problems or issues. 03/27/14: Breast, left, 11:00, subareolar, excision: Borderline phyllodes tumor. 01/23/19: Left breast, retroareolar mass, core biopsy: Low grade fibroepithelial lesion. The differential diagnosis includes fibroadenoma as well as low-grade phyllodes tumor. 02/12/19: Breast, left, lumpectomy: Borderline Phyllodes tumor, at least 8 mm, lymphovascular invasion not identified. Borderline phyllodes. Last screening and diagnostic imaging, including biopsy, January, 2019 ROS Physical Exam 
 
ASSESSMENT and PLAN 
{ASSESSMENT/PLAN:70927}

## 2019-09-25 NOTE — PROGRESS NOTES
HISTORY OF PRESENT ILLNESS  Eulogio Morales is a 66 y.o. female. HPI    ESTABLISHED patient here for 6 mo follow-up, s/p LEFT breast lumpectomy for phyllodes tumor. She states she has had no problems at surgical site and has no other breast problems or issues.     03/27/14: Breast, left, 11:00, subareolar, excision: Borderline phyllodes tumor.     01/23/19: Left breast, retroareolar mass, core biopsy: Low grade fibroepithelial lesion. The differential diagnosis includes fibroadenoma as well as low-grade phyllodes tumor.     02/12/19: Breast, left, lumpectomy: Borderline Phyllodes tumor, at least 8 mm, lymphovascular invasion not identified.  Borderline phyllodes.     Last screening and diagnostic imaging, including biopsy, January, 2019      Past Medical History:   Diagnosis Date    Adrenal cancer ([facility] [address] 1996    Adverse effect of anesthesia 2014    \"stopped breathing during MAC, diagnosed with sleep apnea at that time\"    Anxiety and depression     Arthritis     pt not aware of this diagnosis    Asthma 2000s    mostly resolved    Autoimmune disease ([facility] [address]     VITALIGO    Bronchitis     yearly    Chronic kidney disease 1980'S    STONES    Colon polyps     Diverticulitis     Diverticulosis     GERD (gastroesophageal reflux disease)     Hypertension     Hypothyroid     Ill-defined condition     \"trouble focusing\"    Ill-defined condition     cyst, back of head    Ill-defined condition     Kidney stones 1980s    Lipoma     front of right shoulder, right AC    Nausea & vomiting     after surgery in 1997, no problems since    Palpitations     Psychiatric disorder     ANXIETY AND DEPRESSION    Retinal tear left    partial, has floaters in vision    Shingles 12/2015    Skin cancer 2004    melanoma- L flank    Skin cancer 2006    melanoma- L frontal scalp    Skin cancer     MID BACK    Sleep apnea     uses CPAP    Sun-damaged skin     Tanning bed exposure     Vitiligo     \"melanoma induced vitiligo\"       Past Surgical History:   Procedure Laterality Date    HX BREAST BIOPSY  3/27/2014    EXCISION OF LEFT BREAST NODULE performed by Shari Urrutia MD at [address] HX BREAST LUMPECTOMY Left 2/12/2019    LEFT BREAST LUMPECTOMY WITH ULTRASOUND performed by Peter Crowder MD at [address] HX BREAST LUMPECTOMY Left 2/28/2019    LEFT BREAST RE EXCISION LUMPECTOMY performed by Peter Crowder MD at [facility] AM[facility]UL[facility] OR    HX BREAST LUMPECTOMY      pt states not arm restricted    HX CHOLECYSTECTOMY  1996    HX COLONOSCOPY      HX GI      COLONOSCOPY    HX GYN  2013    uterine ablation    HX HEENT Left 2019    laser surgery for retinal tear    HX OTHER SURGICAL      2 melanoma surgeries, left flank and left head    HX OTHER SURGICAL  1997    adrenalectomy    HX OTHER SURGICAL      3 lipomas removed from RIGHT arm    HX WISDOM TEETH EXTRACTION  2000       Social History     Socioeconomic History    Marital status:      Spouse name: Not on file    Number of children: Not on file    Years of education: Not on file    Highest education level: Not on file   Occupational History    Not on file   Social Needs    Financial resource strain: Not on file    Food insecurity:     Worry: Not on file     Inability: Not on file    Transportation needs:     Medical: Not on file     Non-medical: Not on file   Tobacco Use    Smoking status: Never Smoker    Smokeless tobacco: Never Used   Substance and Sexual Activity    Alcohol use: Yes     Comment: 1 drink/week (may be wine, beer, or liquor)    Drug use: No    Sexual activity: Not on file   Lifestyle    Physical activity:     Days per week: Not on file     Minutes per session: Not on file    Stress: Not on file   Relationships    Social connections:     Talks on phone: Not on file     Gets together: Not on file     Attends Evangelical service: Not on file     Active member of club or organization: Not on file Attends meetings of clubs or organizations: Not on file     Relationship status: Not on file    Intimate partner violence:     Fear of current or ex partner: Not on file     Emotionally abused: Not on file     Physically abused: Not on file     Forced sexual activity: Not on file   Other Topics Concern    Not on file   Social History Narrative    Not on file       Current Outpatient Medications on File Prior to Visit   Medication Sig Dispense Refill    ALPRAZolam (XANAX) 2 mg tablet Take 2 mg by mouth nightly.  dextroamphetamine-amphetamine (ADDERALL) 20 mg tablet Take 20 mg by mouth two (2) times a day.  ibuprofen (ADVIL) 200 mg tablet Take 600 mg by mouth every six (6) hours as needed for Pain.  CALCIUM PO Take 1 Tab by mouth daily as needed.  LORazepam (ATIVAN) 1 mg tablet TAKE 1 TABLET BY MOUTH TWICE A DAY AS NEEDED FOR ANXIETY  5    hydrochlorothiazide (HYDRODIURIL) 25 mg tablet Take 25 mg by mouth every morning. 99    esomeprazole (NEXIUM) 20 mg capsule Take 20 mg by mouth every morning.  levothyroxine (SYNTHROID) 150 mcg tablet Take 150 mcg by mouth Daily (before breakfast).  potassium chloride SR (KLOR-CON 10) 10 mEq tablet Take 10 mEq by mouth daily.  escitalopram (LEXAPRO) 20 mg tablet Take 20 mg by mouth every morning.  cholecalciferol, vitamin D3, (VITAMIN D3) 2,000 unit Tab Take 2,000 Units by mouth daily.  ascorbate calcium (VITAMIN C PO) Take 1 Tab by mouth daily as needed.  ZINC PO Take 1 Tab by mouth daily as needed.  ascorbic acid/multivit-min (EMERGEN-C PO) Take 1 Package by mouth daily as needed. No current facility-administered medications on file prior to visit. No Known Allergies    OB History        0    Para        Term                AB        Living           SAB        TAB        Ectopic        Molar        Multiple        Live Births              Obstetric Comments   Menarche:  17. LMP: age 56.   # of Children:  0. Age at Delivery of First Child:  n/a. Hysterectomy/oophorectomy:  NO/NO. Breast Bx:  yes. Hx of Breast Feeding:  no. BCP:  no. Hormone therapy:  no.             ROS      Physical Exam   Cardiovascular: Normal rate and normal heart sounds. Pulmonary/Chest: Breath sounds normal. Right breast exhibits no inverted nipple, no mass, no nipple discharge, no skin change and no tenderness. Left breast exhibits no inverted nipple, no mass, no nipple discharge, no skin change and no tenderness. Breasts are symmetrical.       Lymphadenopathy:        Right cervical: No superficial cervical, no deep cervical and no posterior cervical adenopathy present. Left cervical: No superficial cervical, no deep cervical and no posterior cervical adenopathy present. Right axillary: No pectoral and no lateral adenopathy present. Left axillary: No pectoral and no lateral adenopathy present. BREAST ULTRASOUND  Indication: Hx of LEFT breast phyllodes tumor  Technique: The area was scanned using a high-frequency linear-array near-field transducer  Findings: No abnormal mass, lesion, or shadowing noted; no cysts; no axillary lymphadenopathy  Impression: Normal breast tissue  Disposition: No worrisome finding on ultrasound      ASSESSMENT and PLAN    ICD-10-CM ICD-9-CM    1. Phyllodes tumor, left D48.62 238.3       Patient presents to f/u on LEFT breast phyllodes tumor, and is doing well overall. Physical exam today normal. US visualizes post-op 1.3 x 0.5cm hypoechoic area at 12:00 circumareolar, no mass. Pt notes that she is moving to [place], [place], and asks for a recommendation for a breast specialist there. F/U with me PRN. This plan was reviewed with the patient and patient agrees. All questions were answered.     Written by Magui Das, as dictated by Dr. Joyce Sandhoff, MD.

## 2019-09-30 NOTE — COMMUNICATION BODY
HISTORY OF PRESENT ILLNESS  Mena Lira is a 66 y.o. female. HPI    ESTABLISHED patient here for 6 mo follow-up, s/p LEFT breast lumpectomy for phyllodes tumor. She states she has had no problems at surgical site and has no other breast problems or issues.     03/27/14: Breast, left, 11:00, subareolar, excision: Borderline phyllodes tumor.     01/23/19: Left breast, retroareolar mass, core biopsy: Low grade fibroepithelial lesion. The differential diagnosis includes fibroadenoma as well as low-grade phyllodes tumor.     02/12/19: Breast, left, lumpectomy: Borderline Phyllodes tumor, at least 8 mm, lymphovascular invasion not identified.  Borderline phyllodes.     Last screening and diagnostic imaging, including biopsy, January, 2019      Past Medical History:   Diagnosis Date    Adrenal cancer ([facility] [address] 1996    Adverse effect of anesthesia 2014    \"stopped breathing during MAC, diagnosed with sleep apnea at that time\"    Anxiety and depression     Arthritis     pt not aware of this diagnosis    Asthma 2000s    mostly resolved    Autoimmune disease ([address]     VITALIGO    Bronchitis     yearly    Chronic kidney disease 1980'S    STONES    Colon polyps     Diverticulitis     Diverticulosis     GERD (gastroesophageal reflux disease)     Hypertension     Hypothyroid     Ill-defined condition     \"trouble focusing\"    Ill-defined condition     cyst, back of head    Ill-defined condition     Kidney stones 1980s    Lipoma     front of right shoulder, right AC    Nausea & vomiting     after surgery in 1997, no problems since    Palpitations     Psychiatric disorder     ANXIETY AND DEPRESSION    Retinal tear left    partial, has floaters in vision    Shingles 12/2015    Skin cancer 2004    melanoma- L flank    Skin cancer 2006    melanoma- L frontal scalp    Skin cancer     MID BACK    Sleep apnea     uses CPAP    Sun-damaged skin     Tanning bed exposure     Vitiligo     \"melanoma induced vitiligo\"       Past Surgical History:   Procedure Laterality Date    HX BREAST BIOPSY  3/27/2014    EXCISION OF LEFT BREAST NODULE performed by Doug Carver MD at [address] [place] [address] HX BREAST LUMPECTOMY Left 2/12/2019    LEFT BREAST LUMPECTOMY WITH ULTRASOUND performed by Keaton Alejo MD at [place] [address] HX BREAST LUMPECTOMY Left 2/28/2019    LEFT BREAST RE EXCISION LUMPECTOMY performed by Keaton Alejo MD at [facility] AM[facility]ULATORY OR    HX BREAST LUMPECTOMY      pt states not arm restricted    HX CHOLECYSTECTOMY  1996    HX COLONOSCOPY      HX GI      COLONOSCOPY    HX GYN  2013    uterine ablation    HX HEENT Left 2019    laser surgery for retinal tear    HX OTHER SURGICAL      2 melanoma surgeries, left flank and left head    HX OTHER SURGICAL  1997    adrenalectomy    HX OTHER SURGICAL      3 lipomas removed from RIGHT arm    HX WISDOM TEETH EXTRACTION  2000       Social History     Socioeconomic History    Marital status:      Spouse name: Not on file    Number of children: Not on file    Years of education: Not on file    Highest education level: Not on file   Occupational History    Not on file   Social Needs    Financial resource strain: Not on file    Food insecurity:     Worry: Not on file     Inability: Not on file    Transportation needs:     Medical: Not on file     Non-medical: Not on file   Tobacco Use    Smoking status: Never Smoker    Smokeless tobacco: Never Used   Substance and Sexual Activity    Alcohol use: Yes     Comment: 1 drink/week (may be wine, beer, or liquor)    Drug use: No    Sexual activity: Not on file   Lifestyle    Physical activity:     Days per week: Not on file     Minutes per session: Not on file    Stress: Not on file   Relationships    Social connections:     Talks on phone: Not on file     Gets together: Not on file     Attends Jewish service: Not on file     Active member of club or organization: Not on file Attends meetings of clubs or organizations: Not on file     Relationship status: Not on file    Intimate partner violence:     Fear of current or ex partner: Not on file     Emotionally abused: Not on file     Physically abused: Not on file     Forced sexual activity: Not on file   Other Topics Concern    Not on file   Social History Narrative    Not on file       Current Outpatient Medications on File Prior to Visit   Medication Sig Dispense Refill    ALPRAZolam (XANAX) 2 mg tablet Take 2 mg by mouth nightly.  dextroamphetamine-amphetamine (ADDERALL) 20 mg tablet Take 20 mg by mouth two (2) times a day.  ibuprofen (ADVIL) 200 mg tablet Take 600 mg by mouth every six (6) hours as needed for Pain.  CALCIUM PO Take 1 Tab by mouth daily as needed.  LORazepam (ATIVAN) 1 mg tablet TAKE 1 TABLET BY MOUTH TWICE A DAY AS NEEDED FOR ANXIETY  5    hydrochlorothiazide (HYDRODIURIL) 25 mg tablet Take 25 mg by mouth every morning. 99    esomeprazole (NEXIUM) 20 mg capsule Take 20 mg by mouth every morning.  levothyroxine (SYNTHROID) 150 mcg tablet Take 150 mcg by mouth Daily (before breakfast).  potassium chloride SR (KLOR-CON 10) 10 mEq tablet Take 10 mEq by mouth daily.  escitalopram (LEXAPRO) 20 mg tablet Take 20 mg by mouth every morning.  cholecalciferol, vitamin D3, (VITAMIN D3) 2,000 unit Tab Take 2,000 Units by mouth daily.  ascorbate calcium (VITAMIN C PO) Take 1 Tab by mouth daily as needed.  ZINC PO Take 1 Tab by mouth daily as needed.  ascorbic acid/multivit-min (EMERGEN-C PO) Take 1 Package by mouth daily as needed. No current facility-administered medications on file prior to visit. No Known Allergies    OB History        0    Para        Term                AB        Living           SAB        TAB        Ectopic        Molar        Multiple        Live Births              Obstetric Comments   Menarche:  17. LMP: age 56.   # of Children:  0. Age at Delivery of First Child:  n/a. Hysterectomy/oophorectomy:  NO/NO. Breast Bx:  yes. Hx of Breast Feeding:  no. BCP:  no. Hormone therapy:  no.             ROS      Physical Exam   Cardiovascular: Normal rate and normal heart sounds. Pulmonary/Chest: Breath sounds normal. Right breast exhibits no inverted nipple, no mass, no nipple discharge, no skin change and no tenderness. Left breast exhibits no inverted nipple, no mass, no nipple discharge, no skin change and no tenderness. Breasts are symmetrical.       Lymphadenopathy:        Right cervical: No superficial cervical, no deep cervical and no posterior cervical adenopathy present. Left cervical: No superficial cervical, no deep cervical and no posterior cervical adenopathy present. Right axillary: No pectoral and no lateral adenopathy present. Left axillary: No pectoral and no lateral adenopathy present. BREAST ULTRASOUND  Indication: Hx of LEFT breast phyllodes tumor  Technique: The area was scanned using a high-frequency linear-array near-field transducer  Findings: No abnormal mass, lesion, or shadowing noted; no cysts; no axillary lymphadenopathy  Impression: Normal breast tissue  Disposition: No worrisome finding on ultrasound      ASSESSMENT and PLAN    ICD-10-CM ICD-9-CM    1. Phyllodes tumor, left D48.62 238.3       Patient presents to f/u on LEFT breast phyllodes tumor, and is doing well overall. Physical exam today normal. US visualizes post-op 1.3 x 0.5cm hypoechoic area at 12:00 circumareolar, no mass. Pt notes that she is moving to [place], [place], and asks for a recommendation for a breast specialist there. F/U with me PRN. This plan was reviewed with the patient and patient agrees. All questions were answered.     Written by Gopal Alvarez, as dictated by Dr. Carol Brooks MD.

## (undated) DEVICE — INSULATED BLADE ELECTRODE: Brand: EDGE

## (undated) DEVICE — 1200 GUARD II KIT W/5MM TUBE W/O VAC TUBE: Brand: GUARDIAN

## (undated) DEVICE — Device

## (undated) DEVICE — TRAP FLUID BUFFALO FLTR

## (undated) DEVICE — INTENDED FOR TISSUE SEPARATION, AND OTHER PROCEDURES THAT REQUIRE A SHARP SURGICAL BLADE TO PUNCTURE OR CUT.: Brand: BARD-PARKER ® CARBON RIB-BACK BLADES

## (undated) DEVICE — KENDALL SCD EXPRESS SLEEVES, KNEE LENGTH, MEDIUM: Brand: KENDALL SCD

## (undated) DEVICE — STERILE POLYISOPRENE POWDER-FREE SURGICAL GLOVES: Brand: PROTEXIS

## (undated) DEVICE — SMOKE EVACUATION PENCIL: Brand: VALLEYLAB

## (undated) DEVICE — SYR 10ML CTRL LR LCK NSAF LF --

## (undated) DEVICE — SUTURE ETHLN SZ 4-0 L18IN NONABSORBABLE BLK L19MM PS-2 3/8 1667H

## (undated) DEVICE — INFECTION CONTROL KIT SYS

## (undated) DEVICE — (D)PREP SKN CHLRAPRP APPL 26ML -- CONVERT TO ITEM 371833

## (undated) DEVICE — SUTURE MCRYL SZ 4-0 L27IN ABSRB UD L19MM PS-2 1/2 CIR PRIM Y426H

## (undated) DEVICE — BANDAGE,GAUZE,CONFORMING,3"X75",STRL,LF: Brand: MEDLINE

## (undated) DEVICE — SPONGE GZ W4XL4IN COT 12 PLY TYP VII WVN C FLD DSGN

## (undated) DEVICE — SOL IRRIGATION INJ NACL 0.9% 500ML BTL

## (undated) DEVICE — APPLICATOR BNDG 1MM ADH PREMIERPRO EXOFIN

## (undated) DEVICE — HANDLE LT SNAP ON ULT DURABLE LENS FOR TRUMPF ALC DISPOSABLE

## (undated) DEVICE — TOWEL SURG W17XL27IN STD BLU COT NONFENESTRATED PREWASHED

## (undated) DEVICE — LIGHT HANDLE: Brand: DEVON

## (undated) DEVICE — SURGICAL PROCEDURE PACK BASIN MAJ SET CUST NO CAUT

## (undated) DEVICE — PACK,BASIC,SIRUS,V: Brand: MEDLINE

## (undated) DEVICE — STOCKINETTE,IMPERVIOUS,12X48,STERILE: Brand: MEDLINE

## (undated) DEVICE — COVER US PRB W15XL120CM W/ GEL RUBBERBAND TAPE STRP FLD GEN

## (undated) DEVICE — DEVON™ KNEE AND BODY STRAP 60" X 3" (1.5 M X 7.6 CM): Brand: DEVON

## (undated) DEVICE — SUTURE VCRL SZ 3-0 L27IN ABSRB UD FS-2 L19MM 1/2 CIR J423H

## (undated) DEVICE — SUT SLK 2-0SH 30IN BLK --

## (undated) DEVICE — FRAZIER SUCTION INSTRUMENT 12 FR W/CONTROL VENT & OBTURATOR: Brand: FRAZIER

## (undated) DEVICE — GOWN,NON-REINFORCED,XXL: Brand: MEDLINE

## (undated) DEVICE — ZIMMER® STERILE DISPOSABLE TOURNIQUET CUFF WITH PROTECTIVE SLEEVE AND PLC, DUAL PORT, SINGLE BLADDER, 18 IN. (46 CM)

## (undated) DEVICE — DRAPE,REIN 53X77,STERILE: Brand: MEDLINE

## (undated) DEVICE — SUTURE VCRL 2-0 L27IN ABSRB CT BRAID COAT UD J275H

## (undated) DEVICE — SUTURE VCRL SZ 3-0 L27IN ABSRB UD L26MM SH 1/2 CIR J416H

## (undated) DEVICE — DRAPE,CHEST,FENES,15X10,STERIL: Brand: MEDLINE

## (undated) DEVICE — BANDAGE COMPR SELF ADH 5 YDX4 IN TAN STRL PREMIERPRO LF

## (undated) DEVICE — NEEDLE HYPO 25GA L1.5IN BVL ORIENTED ECLIPSE

## (undated) DEVICE — REM POLYHESIVE ADULT PATIENT RETURN ELECTRODE: Brand: VALLEYLAB

## (undated) DEVICE — NEEDLE HYPO 22GA L1.5IN BLK S STL HUB POLYPR SHLD REG BVL

## (undated) DEVICE — DEVICE TRNSF SPIK STL 2008S] MICROTEK MEDICAL INC]

## (undated) DEVICE — SOLUTION IV 1000ML 0.9% SOD CHL

## (undated) DEVICE — ROCKER SWITCH PENCIL BLADE ELECTRODE, HOLSTER: Brand: EDGE

## (undated) DEVICE — MEDI-VAC NON-CONDUCTIVE SUCTION TUBING: Brand: CARDINAL HEALTH

## (undated) DEVICE — SYR 10ML LUER LOK 1/5ML GRAD --

## (undated) DEVICE — CHEST PACK: Brand: MEDLINE INDUSTRIES, INC.

## (undated) DEVICE — SUTURE PDS II SZ 2-0 L36IN ABSRB VLT CT L40MM 1/2 CIR TAPR Z357H

## (undated) DEVICE — BIPOLAR FORCEPS CORD: Brand: VALLEYLAB

## (undated) DEVICE — DRAPE,EXTREMITY,89X128,STERILE: Brand: MEDLINE